# Patient Record
Sex: FEMALE | Race: WHITE | NOT HISPANIC OR LATINO
[De-identification: names, ages, dates, MRNs, and addresses within clinical notes are randomized per-mention and may not be internally consistent; named-entity substitution may affect disease eponyms.]

---

## 2020-09-09 ENCOUNTER — APPOINTMENT (OUTPATIENT)
Dept: FAMILY MEDICINE | Facility: CLINIC | Age: 40
End: 2020-09-09
Payer: COMMERCIAL

## 2020-09-09 ENCOUNTER — LABORATORY RESULT (OUTPATIENT)
Age: 40
End: 2020-09-09

## 2020-09-09 VITALS
OXYGEN SATURATION: 98 % | WEIGHT: 250 LBS | BODY MASS INDEX: 41.65 KG/M2 | RESPIRATION RATE: 16 BRPM | SYSTOLIC BLOOD PRESSURE: 120 MMHG | HEART RATE: 92 BPM | DIASTOLIC BLOOD PRESSURE: 80 MMHG | HEIGHT: 65 IN

## 2020-09-09 DIAGNOSIS — Z81.8 FAMILY HISTORY OF OTHER MENTAL AND BEHAVIORAL DISORDERS: ICD-10-CM

## 2020-09-09 DIAGNOSIS — Z23 ENCOUNTER FOR IMMUNIZATION: ICD-10-CM

## 2020-09-09 LAB
ALBUMIN SERPL ELPH-MCNC: 4.7 G/DL
ALP BLD-CCNC: 47 U/L
ALT SERPL-CCNC: 38 U/L
ANION GAP SERPL CALC-SCNC: 11 MMOL/L
APPEARANCE: CLEAR
AST SERPL-CCNC: 26 U/L
BASOPHILS # BLD AUTO: 0.02 K/UL
BASOPHILS NFR BLD AUTO: 0.4 %
BILIRUB SERPL-MCNC: 0.5 MG/DL
BILIRUBIN URINE: NEGATIVE
BLOOD URINE: NEGATIVE
BUN SERPL-MCNC: 12 MG/DL
CALCIUM SERPL-MCNC: 10 MG/DL
CHLORIDE SERPL-SCNC: 104 MMOL/L
CHOLEST SERPL-MCNC: 159 MG/DL
CHOLEST/HDLC SERPL: 2.6 RATIO
CO2 SERPL-SCNC: 25 MMOL/L
COLOR: NORMAL
CREAT SERPL-MCNC: 0.94 MG/DL
EOSINOPHIL # BLD AUTO: 0.08 K/UL
EOSINOPHIL NFR BLD AUTO: 1.5 %
ESTIMATED AVERAGE GLUCOSE: 105 MG/DL
GLUCOSE QUALITATIVE U: NEGATIVE
GLUCOSE SERPL-MCNC: 102 MG/DL
HBA1C MFR BLD HPLC: 5.3 %
HCT VFR BLD CALC: 46.8 %
HDLC SERPL-MCNC: 62 MG/DL
HGB BLD-MCNC: 14.8 G/DL
IMM GRANULOCYTES NFR BLD AUTO: 0.2 %
KETONES URINE: NEGATIVE
LDLC SERPL CALC-MCNC: 75 MG/DL
LEUKOCYTE ESTERASE URINE: ABNORMAL
LYMPHOCYTES # BLD AUTO: 1.32 K/UL
LYMPHOCYTES NFR BLD AUTO: 25 %
MAN DIFF?: NORMAL
MCHC RBC-ENTMCNC: 29.2 PG
MCHC RBC-ENTMCNC: 31.6 GM/DL
MCV RBC AUTO: 92.5 FL
MONOCYTES # BLD AUTO: 0.23 K/UL
MONOCYTES NFR BLD AUTO: 4.4 %
NEUTROPHILS # BLD AUTO: 3.61 K/UL
NEUTROPHILS NFR BLD AUTO: 68.5 %
NITRITE URINE: NEGATIVE
PH URINE: 7
PLATELET # BLD AUTO: 382 K/UL
POTASSIUM SERPL-SCNC: 4.9 MMOL/L
PROT SERPL-MCNC: 7.3 G/DL
PROTEIN URINE: NEGATIVE
RBC # BLD: 5.06 M/UL
RBC # FLD: 12.9 %
SODIUM SERPL-SCNC: 140 MMOL/L
SPECIFIC GRAVITY URINE: 1.01
THYROGLOB AB SERPL-ACNC: 279 IU/ML
THYROPEROXIDASE AB SERPL IA-ACNC: 49.8 IU/ML
TRIGL SERPL-MCNC: 110 MG/DL
TSH SERPL-ACNC: 8.38 UIU/ML
UROBILINOGEN URINE: NORMAL
WBC # FLD AUTO: 5.27 K/UL

## 2020-09-09 PROCEDURE — 99385 PREV VISIT NEW AGE 18-39: CPT | Mod: 25

## 2020-09-09 PROCEDURE — 36415 COLL VENOUS BLD VENIPUNCTURE: CPT

## 2020-09-09 PROCEDURE — 90686 IIV4 VACC NO PRSV 0.5 ML IM: CPT

## 2020-09-09 PROCEDURE — G0008: CPT

## 2020-09-09 RX ORDER — MULTIVITAMIN
TABLET ORAL
Refills: 0 | Status: ACTIVE | COMMUNITY
Start: 2020-09-09

## 2020-09-09 NOTE — HEALTH RISK ASSESSMENT
[Excellent] : ~his/her~  mood as  excellent [] : No [No falls in past year] : Patient reported no falls in the past year [Yes] : Yes [[id number]] : 0 [0] : 2) Feeling down, depressed, or hopeless: Not at all (0) [With Significant Other] : lives with significant other [Employed] : employed [] :  [Fully functional (using the telephone, shopping, preparing meals, housekeeping, doing laundry, using] : Fully functional and needs no help or supervision to perform IADLs (using the telephone, shopping, preparing meals, housekeeping, doing laundry, using transportation, managing medications and managing finances) [Fully functional (bathing, dressing, toileting, transferring, walking, feeding)] : Fully functional (bathing, dressing, toileting, transferring, walking, feeding) [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

## 2020-09-09 NOTE — ASSESSMENT
[FreeTextEntry1] : Patient was counseled on healthy eating habits, on daily exercise and stress relief. All medications and allergies were reviewed with the patient and any adjustments necessary were made. Patient was counseled to try engage in an exercise activity for at least 30 minutes 3-4 times a week.  Patient was advised to eat a diet low in fat and carbohydrates and high in protein, with plenty of vegetables. Patient was advised to try and engage in relaxing activities whenever possible.\par The patients blood was draw in office and will be followed and assessed for any issues.  Patient was told to return to the office if any issues arise.  Unless otherwise stated, the patient is to continue all other medications as previously prescribed.\par \par PCOS\par on metformin

## 2020-09-09 NOTE — PHYSICAL EXAM
[Well Nourished] : well nourished [Clear to Auscultation] : lungs were clear to auscultation bilaterally [No Joint Swelling] : no joint swelling [Normal S1, S2] : normal S1 and S2 [Regular Rhythm] : with a regular rhythm [Normal Gait] : normal gait [No Rash] : no rash [Normal Affect] : the affect was normal [Normal Insight/Judgement] : insight and judgment were intact

## 2020-09-12 ENCOUNTER — TRANSCRIPTION ENCOUNTER (OUTPATIENT)
Age: 40
End: 2020-09-12

## 2020-09-17 ENCOUNTER — TRANSCRIPTION ENCOUNTER (OUTPATIENT)
Age: 40
End: 2020-09-17

## 2020-09-18 ENCOUNTER — TRANSCRIPTION ENCOUNTER (OUTPATIENT)
Age: 40
End: 2020-09-18

## 2020-10-13 ENCOUNTER — APPOINTMENT (OUTPATIENT)
Dept: ULTRASOUND IMAGING | Facility: IMAGING CENTER | Age: 40
End: 2020-10-13
Payer: COMMERCIAL

## 2020-10-13 ENCOUNTER — OUTPATIENT (OUTPATIENT)
Dept: OUTPATIENT SERVICES | Facility: HOSPITAL | Age: 40
LOS: 1 days | End: 2020-10-13
Payer: COMMERCIAL

## 2020-10-13 DIAGNOSIS — E07.9 DISORDER OF THYROID, UNSPECIFIED: ICD-10-CM

## 2020-10-13 PROCEDURE — 76536 US EXAM OF HEAD AND NECK: CPT | Mod: 26

## 2020-10-13 PROCEDURE — 76536 US EXAM OF HEAD AND NECK: CPT

## 2020-10-14 ENCOUNTER — APPOINTMENT (OUTPATIENT)
Dept: FAMILY MEDICINE | Facility: CLINIC | Age: 40
End: 2020-10-14
Payer: COMMERCIAL

## 2020-10-14 VITALS
BODY MASS INDEX: 40.48 KG/M2 | HEART RATE: 89 BPM | DIASTOLIC BLOOD PRESSURE: 70 MMHG | OXYGEN SATURATION: 90 % | WEIGHT: 243 LBS | HEIGHT: 65 IN | SYSTOLIC BLOOD PRESSURE: 130 MMHG

## 2020-10-14 PROCEDURE — 36415 COLL VENOUS BLD VENIPUNCTURE: CPT

## 2020-10-14 PROCEDURE — 99214 OFFICE O/P EST MOD 30 MIN: CPT | Mod: 25

## 2020-10-14 NOTE — HISTORY OF PRESENT ILLNESS
[de-identified] : 39 year old female is here for a followup visit. Patient is here for medication renewals and for blood work discussion. Medications and allergies were reviewed and assessed.  There has been no new medications since the last visit. \par \par patient trying to conceive, last tsh was 8, started on synthroid

## 2020-10-14 NOTE — ASSESSMENT
[FreeTextEntry1] : thyroid dysfunction\par Patient had a diagnosis of thyroid disorder. Blood was drawn to assess levels of TSH and T4. Patient will continue on current dose of medications at this time unless instructed otherwise. Patient was advised to take thyroid medications on a empty stomach and to wait at least 45 minutes before eating for appropriate absorption.\par patient went for us yesterday\par last tsh was 8, started on synthroid 75, feeling better, lost weight, trying to conceive so will have goal of tsh less than 2\par lost 7 pounds\par \par PCOS\par on metformin

## 2020-10-14 NOTE — HEALTH RISK ASSESSMENT
[] : No [Yes] : Yes [No falls in past year] : Patient reported no falls in the past year [0] : 2) Feeling down, depressed, or hopeless: Not at all (0) [[id number]] : 0 [Excellent] : ~his/her~  mood as  excellent [With Significant Other] : lives with significant other [Employed] : employed [] :  [Fully functional (bathing, dressing, toileting, transferring, walking, feeding)] : Fully functional (bathing, dressing, toileting, transferring, walking, feeding) [Fully functional (using the telephone, shopping, preparing meals, housekeeping, doing laundry, using] : Fully functional and needs no help or supervision to perform IADLs (using the telephone, shopping, preparing meals, housekeeping, doing laundry, using transportation, managing medications and managing finances) [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

## 2020-10-15 LAB
T3 SERPL-MCNC: 118 NG/DL
T4 FREE SERPL-MCNC: 1.4 NG/DL
TSH SERPL-ACNC: 4.18 UIU/ML

## 2020-10-16 ENCOUNTER — TRANSCRIPTION ENCOUNTER (OUTPATIENT)
Age: 40
End: 2020-10-16

## 2020-12-14 ENCOUNTER — TRANSCRIPTION ENCOUNTER (OUTPATIENT)
Age: 40
End: 2020-12-14

## 2020-12-18 ENCOUNTER — APPOINTMENT (OUTPATIENT)
Dept: FAMILY MEDICINE | Facility: CLINIC | Age: 40
End: 2020-12-18
Payer: COMMERCIAL

## 2020-12-18 VITALS
HEART RATE: 101 BPM | DIASTOLIC BLOOD PRESSURE: 74 MMHG | OXYGEN SATURATION: 98 % | BODY MASS INDEX: 39.27 KG/M2 | SYSTOLIC BLOOD PRESSURE: 122 MMHG | WEIGHT: 236 LBS

## 2020-12-18 LAB
T3 SERPL-MCNC: 108 NG/DL
T4 FREE SERPL-MCNC: 1.4 NG/DL
TSH SERPL-ACNC: 3.02 UIU/ML

## 2020-12-18 PROCEDURE — 36415 COLL VENOUS BLD VENIPUNCTURE: CPT

## 2020-12-18 PROCEDURE — 99214 OFFICE O/P EST MOD 30 MIN: CPT | Mod: 25

## 2020-12-18 PROCEDURE — 99072 ADDL SUPL MATRL&STAF TM PHE: CPT

## 2020-12-18 NOTE — ASSESSMENT
[FreeTextEntry1] : thyroid dysfunction\par Patient had a diagnosis of thyroid disorder. Blood was drawn to assess levels of TSH and T4. Patient will continue on current dose of medications at this time unless instructed otherwise. Patient was advised to take thyroid medications on a empty stomach and to wait at least 45 minutes before eating for appropriate absorption.\par patient went for us yesterday\par last tsh was 8, started on synthroid 75, feeling better, lost weight, \par last tsh is 4\par now 6 weeks pregnant, will increase synthroid to 100 to keep tsh under 2.\par \par PCOS\par on metformin

## 2020-12-18 NOTE — HEALTH RISK ASSESSMENT
[] : No [Yes] : Yes [No falls in past year] : Patient reported no falls in the past year [0] : 2) Feeling down, depressed, or hopeless: Not at all (0) [BSY5Uizbn] : 0 [Excellent] : ~his/her~  mood as  excellent [With Significant Other] : lives with significant other [Employed] : employed [] :  [Fully functional (bathing, dressing, toileting, transferring, walking, feeding)] : Fully functional (bathing, dressing, toileting, transferring, walking, feeding) [Fully functional (using the telephone, shopping, preparing meals, housekeeping, doing laundry, using] : Fully functional and needs no help or supervision to perform IADLs (using the telephone, shopping, preparing meals, housekeeping, doing laundry, using transportation, managing medications and managing finances) [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

## 2020-12-18 NOTE — HISTORY OF PRESENT ILLNESS
[de-identified] : 40 year old female is here for a followup visit. Patient is here for medication renewals and for blood work discussion. Medications and allergies were reviewed and assessed.  There has been no new medications since the last visit. Patient is feeling well with no active changes or issues since Her last visit.\par \par patient trying to conceive, last tsh was 8, started on synthroid, went down to 4

## 2021-01-13 ENCOUNTER — RESULT REVIEW (OUTPATIENT)
Age: 41
End: 2021-01-13

## 2021-01-25 ENCOUNTER — TRANSCRIPTION ENCOUNTER (OUTPATIENT)
Age: 41
End: 2021-01-25

## 2021-01-26 ENCOUNTER — APPOINTMENT (OUTPATIENT)
Dept: FAMILY MEDICINE | Facility: CLINIC | Age: 41
End: 2021-01-26

## 2021-01-29 ENCOUNTER — APPOINTMENT (OUTPATIENT)
Dept: ANTEPARTUM | Facility: CLINIC | Age: 41
End: 2021-01-29

## 2021-02-03 ENCOUNTER — APPOINTMENT (OUTPATIENT)
Dept: ANTEPARTUM | Facility: CLINIC | Age: 41
End: 2021-02-03

## 2021-02-04 ENCOUNTER — APPOINTMENT (OUTPATIENT)
Dept: ANTEPARTUM | Facility: CLINIC | Age: 41
End: 2021-02-04
Payer: COMMERCIAL

## 2021-02-04 ENCOUNTER — APPOINTMENT (OUTPATIENT)
Dept: ANTEPARTUM | Facility: CLINIC | Age: 41
End: 2021-02-04

## 2021-02-04 ENCOUNTER — ASOB RESULT (OUTPATIENT)
Age: 41
End: 2021-02-04

## 2021-02-04 ENCOUNTER — OUTPATIENT (OUTPATIENT)
Dept: OUTPATIENT SERVICES | Facility: HOSPITAL | Age: 41
LOS: 1 days | End: 2021-02-04
Payer: COMMERCIAL

## 2021-02-04 DIAGNOSIS — Z3A.12 12 WEEKS GESTATION OF PREGNANCY: ICD-10-CM

## 2021-02-04 DIAGNOSIS — O28.5 ABNORMAL CHROMOSOMAL AND GENETIC FINDING ON ANTENATAL SCREENING OF MOTHER: ICD-10-CM

## 2021-02-04 DIAGNOSIS — Z01.818 ENCOUNTER FOR OTHER PREPROCEDURAL EXAMINATION: ICD-10-CM

## 2021-02-04 DIAGNOSIS — O99.281 ENDOCRINE, NUTRITIONAL AND METABOLIC DISEASES COMPLICATING PREGNANCY, FIRST TRIMESTER: ICD-10-CM

## 2021-02-04 DIAGNOSIS — O99.211 OBESITY COMPLICATING PREGNANCY, FIRST TRIMESTER: ICD-10-CM

## 2021-02-04 DIAGNOSIS — O09.521 SUPERVISION OF ELDERLY MULTIGRAVIDA, FIRST TRIMESTER: ICD-10-CM

## 2021-02-04 DIAGNOSIS — Z36.2 ENCOUNTER FOR OTHER ANTENATAL SCREENING FOLLOW-UP: ICD-10-CM

## 2021-02-04 PROCEDURE — 88280 CHROMOSOME KARYOTYPE STUDY: CPT

## 2021-02-04 PROCEDURE — 88291 CYTO/MOLECULAR REPORT: CPT

## 2021-02-04 PROCEDURE — 76945 ECHO GUIDE VILLUS SAMPLING: CPT

## 2021-02-04 PROCEDURE — 59015 CHORION BIOPSY: CPT

## 2021-02-04 PROCEDURE — 76945 ECHO GUIDE VILLUS SAMPLING: CPT | Mod: 26

## 2021-02-04 PROCEDURE — 99072 ADDL SUPL MATRL&STAF TM PHE: CPT

## 2021-02-04 PROCEDURE — 88267 CHROMOSOME ANALYS PLACENTA: CPT

## 2021-02-04 PROCEDURE — 76801 OB US < 14 WKS SINGLE FETUS: CPT

## 2021-02-04 PROCEDURE — 88271 CYTOGENETICS DNA PROBE: CPT

## 2021-02-04 PROCEDURE — 88275 CYTOGENETICS 100-300: CPT

## 2021-02-04 PROCEDURE — 88235 TISSUE CULTURE PLACENTA: CPT

## 2021-02-04 PROCEDURE — 88285 CHROMOSOME COUNT ADDITIONAL: CPT

## 2021-02-16 ENCOUNTER — APPOINTMENT (OUTPATIENT)
Dept: OBGYN | Facility: CLINIC | Age: 41
End: 2021-02-16
Payer: COMMERCIAL

## 2021-02-16 ENCOUNTER — OUTPATIENT (OUTPATIENT)
Dept: OUTPATIENT SERVICES | Facility: HOSPITAL | Age: 41
LOS: 1 days | End: 2021-02-16
Payer: COMMERCIAL

## 2021-02-16 VITALS
HEIGHT: 65 IN | WEIGHT: 242 LBS | DIASTOLIC BLOOD PRESSURE: 70 MMHG | BODY MASS INDEX: 40.32 KG/M2 | TEMPERATURE: 97.5 F | SYSTOLIC BLOOD PRESSURE: 110 MMHG

## 2021-02-16 DIAGNOSIS — Q90.9 DOWN SYNDROME, UNSPECIFIED: ICD-10-CM

## 2021-02-16 DIAGNOSIS — N92.6 IRREGULAR MENSTRUATION, UNSPECIFIED: ICD-10-CM

## 2021-02-16 DIAGNOSIS — Z86.39 PERSONAL HISTORY OF OTHER ENDOCRINE, NUTRITIONAL AND METABOLIC DISEASE: ICD-10-CM

## 2021-02-16 DIAGNOSIS — Z78.9 OTHER SPECIFIED HEALTH STATUS: ICD-10-CM

## 2021-02-16 DIAGNOSIS — Z11.52 ENCOUNTER FOR SCREENING FOR COVID-19: ICD-10-CM

## 2021-02-16 LAB — SARS-COV-2 RNA SPEC QL NAA+PROBE: SIGNIFICANT CHANGE UP

## 2021-02-16 PROCEDURE — 99072 ADDL SUPL MATRL&STAF TM PHE: CPT

## 2021-02-16 PROCEDURE — 76815 OB US LIMITED FETUS(S): CPT

## 2021-02-16 PROCEDURE — 99203 OFFICE O/P NEW LOW 30 MIN: CPT | Mod: 25

## 2021-02-16 PROCEDURE — C9803: CPT

## 2021-02-16 PROCEDURE — U0005: CPT

## 2021-02-16 PROCEDURE — U0003: CPT

## 2021-02-16 NOTE — PHYSICAL EXAM
[Appropriately responsive] : appropriately responsive [Alert] : alert [No Acute Distress] : no acute distress [Oriented x3] : oriented x3 [Soft] : soft [Non-tender] : non-tender [Non-distended] : non-distended

## 2021-02-16 NOTE — PROCEDURE
[Transabdominal OB Sonogram] : Transabdominal OB Sonogram [Intrauterine Pregnancy] : intrauterine pregnancy [Fetal Heart] : fetal heart present [FreeTextEntry1] : BPD: 2.57

## 2021-02-17 ENCOUNTER — OUTPATIENT (OUTPATIENT)
Dept: OUTPATIENT SERVICES | Facility: HOSPITAL | Age: 41
LOS: 1 days | End: 2021-02-17
Payer: COMMERCIAL

## 2021-02-17 VITALS
SYSTOLIC BLOOD PRESSURE: 129 MMHG | TEMPERATURE: 98 F | HEIGHT: 66 IN | DIASTOLIC BLOOD PRESSURE: 83 MMHG | HEART RATE: 94 BPM | RESPIRATION RATE: 20 BRPM | WEIGHT: 246.04 LBS | OXYGEN SATURATION: 98 %

## 2021-02-17 DIAGNOSIS — Z98.890 OTHER SPECIFIED POSTPROCEDURAL STATES: Chronic | ICD-10-CM

## 2021-02-17 DIAGNOSIS — Q90.9 DOWN SYNDROME, UNSPECIFIED: ICD-10-CM

## 2021-02-17 DIAGNOSIS — Z01.818 ENCOUNTER FOR OTHER PREPROCEDURAL EXAMINATION: ICD-10-CM

## 2021-02-17 LAB
BLD GP AB SCN SERPL QL: NEGATIVE — SIGNIFICANT CHANGE UP
HCT VFR BLD CALC: 43.2 % — SIGNIFICANT CHANGE UP (ref 34.5–45)
HGB BLD-MCNC: 14.3 G/DL — SIGNIFICANT CHANGE UP (ref 11.5–15.5)
MCHC RBC-ENTMCNC: 29.5 PG — SIGNIFICANT CHANGE UP (ref 27–34)
MCHC RBC-ENTMCNC: 33.1 GM/DL — SIGNIFICANT CHANGE UP (ref 32–36)
MCV RBC AUTO: 89.1 FL — SIGNIFICANT CHANGE UP (ref 80–100)
NRBC # BLD: 0 /100 WBCS — SIGNIFICANT CHANGE UP (ref 0–0)
PLATELET # BLD AUTO: 365 K/UL — SIGNIFICANT CHANGE UP (ref 150–400)
RBC # BLD: 4.85 M/UL — SIGNIFICANT CHANGE UP (ref 3.8–5.2)
RBC # FLD: 12.4 % — SIGNIFICANT CHANGE UP (ref 10.3–14.5)
RH IG SCN BLD-IMP: POSITIVE — SIGNIFICANT CHANGE UP
WBC # BLD: 7.43 K/UL — SIGNIFICANT CHANGE UP (ref 3.8–10.5)
WBC # FLD AUTO: 7.43 K/UL — SIGNIFICANT CHANGE UP (ref 3.8–10.5)

## 2021-02-17 PROCEDURE — G0463: CPT

## 2021-02-17 PROCEDURE — 86900 BLOOD TYPING SEROLOGIC ABO: CPT

## 2021-02-17 PROCEDURE — 86850 RBC ANTIBODY SCREEN: CPT

## 2021-02-17 PROCEDURE — 85027 COMPLETE CBC AUTOMATED: CPT

## 2021-02-17 PROCEDURE — 86901 BLOOD TYPING SEROLOGIC RH(D): CPT

## 2021-02-17 RX ORDER — LIDOCAINE HCL 20 MG/ML
0.2 VIAL (ML) INJECTION ONCE
Refills: 0 | Status: DISCONTINUED | OUTPATIENT
Start: 2021-02-19 | End: 2021-03-06

## 2021-02-17 RX ORDER — SODIUM CHLORIDE 9 MG/ML
3 INJECTION INTRAMUSCULAR; INTRAVENOUS; SUBCUTANEOUS EVERY 8 HOURS
Refills: 0 | Status: DISCONTINUED | OUTPATIENT
Start: 2021-02-19 | End: 2021-03-06

## 2021-02-17 NOTE — H&P PST ADULT - NSANTHOSAYNRD_GEN_A_CORE
No. RUT screening performed.  STOP BANG Legend: 0-2 = LOW Risk; 3-4 = INTERMEDIATE Risk; 5-8 = HIGH Risk

## 2021-02-17 NOTE — H&P PST ADULT - NSICDXPROBLEM_GEN_ALL_CORE_FT
PROBLEM DIAGNOSES  Problem: Down syndrome, unspecified  Assessment and Plan: D&E w/ultrsound guidance

## 2021-02-17 NOTE — H&P PST ADULT - HISTORY OF PRESENT ILLNESS
39 yo female  14 wks pregnancy with abnormal results showing trisomy 21. 39 yo female  14 wks pregnancy with abnormal results showing trisomy 21. Pt for D&E scheduled 21.    COVID swab 21 41 yo female  14 wks pregnant, LMP 2020 with fetal anomaly of trisomy 21. Pt for D&E scheduled 21.    COVID swab 21

## 2021-02-18 ENCOUNTER — TRANSCRIPTION ENCOUNTER (OUTPATIENT)
Age: 41
End: 2021-02-18

## 2021-02-19 ENCOUNTER — APPOINTMENT (OUTPATIENT)
Dept: OBGYN | Facility: CLINIC | Age: 41
End: 2021-02-19

## 2021-02-19 ENCOUNTER — OUTPATIENT (OUTPATIENT)
Dept: OUTPATIENT SERVICES | Facility: HOSPITAL | Age: 41
LOS: 1 days | End: 2021-02-19
Payer: COMMERCIAL

## 2021-02-19 ENCOUNTER — RESULT REVIEW (OUTPATIENT)
Age: 41
End: 2021-02-19

## 2021-02-19 VITALS
RESPIRATION RATE: 16 BRPM | HEART RATE: 82 BPM | OXYGEN SATURATION: 100 % | SYSTOLIC BLOOD PRESSURE: 111 MMHG | DIASTOLIC BLOOD PRESSURE: 61 MMHG

## 2021-02-19 VITALS
TEMPERATURE: 99 F | HEIGHT: 66 IN | DIASTOLIC BLOOD PRESSURE: 84 MMHG | RESPIRATION RATE: 16 BRPM | SYSTOLIC BLOOD PRESSURE: 128 MMHG | WEIGHT: 246.04 LBS | HEART RATE: 106 BPM | OXYGEN SATURATION: 99 %

## 2021-02-19 DIAGNOSIS — Z01.818 ENCOUNTER FOR OTHER PREPROCEDURAL EXAMINATION: ICD-10-CM

## 2021-02-19 DIAGNOSIS — Q90.9 DOWN SYNDROME, UNSPECIFIED: ICD-10-CM

## 2021-02-19 DIAGNOSIS — Z98.890 OTHER SPECIFIED POSTPROCEDURAL STATES: Chronic | ICD-10-CM

## 2021-02-19 LAB
BLD GP AB SCN SERPL QL: NEGATIVE — SIGNIFICANT CHANGE UP
RH IG SCN BLD-IMP: POSITIVE — SIGNIFICANT CHANGE UP

## 2021-02-19 PROCEDURE — 76998 US GUIDE INTRAOP: CPT | Mod: 26

## 2021-02-19 PROCEDURE — 59841 INDUCED ABORTION DILAT&EVAC: CPT

## 2021-02-19 PROCEDURE — 86901 BLOOD TYPING SEROLOGIC RH(D): CPT

## 2021-02-19 PROCEDURE — 86900 BLOOD TYPING SEROLOGIC ABO: CPT

## 2021-02-19 PROCEDURE — 86850 RBC ANTIBODY SCREEN: CPT

## 2021-02-19 PROCEDURE — 88307 TISSUE EXAM BY PATHOLOGIST: CPT

## 2021-02-19 PROCEDURE — 88307 TISSUE EXAM BY PATHOLOGIST: CPT | Mod: 26

## 2021-02-19 RX ORDER — OXYCODONE HYDROCHLORIDE 5 MG/1
5 TABLET ORAL ONCE
Refills: 0 | Status: DISCONTINUED | OUTPATIENT
Start: 2021-02-19 | End: 2021-02-19

## 2021-02-19 RX ORDER — HYDROMORPHONE HYDROCHLORIDE 2 MG/ML
0.5 INJECTION INTRAMUSCULAR; INTRAVENOUS; SUBCUTANEOUS
Refills: 0 | Status: DISCONTINUED | OUTPATIENT
Start: 2021-02-19 | End: 2021-02-19

## 2021-02-19 RX ORDER — SODIUM CHLORIDE 9 MG/ML
1000 INJECTION, SOLUTION INTRAVENOUS
Refills: 0 | Status: DISCONTINUED | OUTPATIENT
Start: 2021-02-19 | End: 2021-03-06

## 2021-02-19 RX ORDER — ONDANSETRON 8 MG/1
4 TABLET, FILM COATED ORAL ONCE
Refills: 0 | Status: COMPLETED | OUTPATIENT
Start: 2021-02-19 | End: 2021-02-19

## 2021-02-19 RX ADMIN — ONDANSETRON 4 MILLIGRAM(S): 8 TABLET, FILM COATED ORAL at 15:18

## 2021-02-19 RX ADMIN — HYDROMORPHONE HYDROCHLORIDE 0.5 MILLIGRAM(S): 2 INJECTION INTRAMUSCULAR; INTRAVENOUS; SUBCUTANEOUS at 15:18

## 2021-02-19 NOTE — BRIEF OPERATIVE NOTE - NSICDXBRIEFPROCEDURE_GEN_ALL_CORE_FT
PROCEDURES:  Dilation and evacuation, uterus, using suction, with US guidance 19-Feb-2021 18:18:17  Frankel, Robyn   PROCEDURES:  Induced  by dilation and evacuation 2021 09:38:51 1. examination under anesthesia  2. standard dilation and evacuation under ultrasound guidance Rhonda Issa

## 2021-02-19 NOTE — BRIEF OPERATIVE NOTE - NSICDXBRIEFPREOP_GEN_ALL_CORE_FT
PRE-OP DIAGNOSIS:  Intrauterine pregnancy 19-Feb-2021 18:18:53  Frankel, Robyn  Trisomy 21 of fetus, current pregnancy 19-Feb-2021 18:19:04  Frankel, Robyn   PRE-OP DIAGNOSIS:  Class 2 obesity with body mass index (BMI) of 39.0 to 39.9 in adult 20-Feb-2021 09:40:00  Rhonda Issa  Trisomy 21 of fetus, current pregnancy 19-Feb-2021 18:19:04 1. IUP @ 14 6/7 weeks  2. trisomy 21 Frankel, Robyn

## 2021-02-19 NOTE — BRIEF OPERATIVE NOTE - OPERATION/FINDINGS
EUA revealed anteverted, 14wk size uterus c/w EGA.   POC examined and consisted with 14wk GA. All fetal parts and placenta accounted for. Entire procedure performed under ultrasound guidance, thin endometrial stripe noted at completion of procedure. anteverted uterus approximately 14 weeks size. fetus and placenta AGA, all fetal parts accounted for. BT: A+

## 2021-02-19 NOTE — ASU DISCHARGE PLAN (ADULT/PEDIATRIC) - CARE PROVIDER_API CALL
Rhonda Issa)  OBSGYN  General  [address] [place] [id number]  Phone: [phone]  Fax: [phone]  Established Patient  Follow Up Time: 2 weeks

## 2021-02-19 NOTE — BRIEF OPERATIVE NOTE - NSICDXBRIEFPOSTOP_GEN_ALL_CORE_FT
POST-OP DIAGNOSIS:  Trisomy 21 of fetus, current pregnancy 19-Feb-2021 18:19:15  Frankel, Robyn  Intrauterine pregnancy 19-Feb-2021 18:19:09  Frankel, Robyn   POST-OP DIAGNOSIS:  Class 2 obesity with body mass index (BMI) of 39.0 to 39.9 in adult 20-Feb-2021 09:40:15  Rhonda Issa  Trisomy 21 of fetus, current pregnancy 19-Feb-2021 18:19:15  Frankel, Robyn

## 2021-02-22 ENCOUNTER — NON-APPOINTMENT (OUTPATIENT)
Age: 41
End: 2021-02-22

## 2021-02-22 PROBLEM — E28.2 POLYCYSTIC OVARIAN SYNDROME: Chronic | Status: ACTIVE | Noted: 2021-02-17

## 2021-02-22 PROBLEM — E07.9 DISORDER OF THYROID, UNSPECIFIED: Chronic | Status: ACTIVE | Noted: 2021-02-17

## 2021-03-01 ENCOUNTER — RX RENEWAL (OUTPATIENT)
Age: 41
End: 2021-03-01

## 2021-03-01 ENCOUNTER — TRANSCRIPTION ENCOUNTER (OUTPATIENT)
Age: 41
End: 2021-03-01

## 2021-03-02 ENCOUNTER — TRANSCRIPTION ENCOUNTER (OUTPATIENT)
Age: 41
End: 2021-03-02

## 2021-03-03 LAB — SURGICAL PATHOLOGY STUDY: SIGNIFICANT CHANGE UP

## 2021-03-10 ENCOUNTER — APPOINTMENT (OUTPATIENT)
Dept: OBGYN | Facility: CLINIC | Age: 41
End: 2021-03-10
Payer: COMMERCIAL

## 2021-03-10 PROCEDURE — 99212 OFFICE O/P EST SF 10 MIN: CPT | Mod: 95

## 2021-03-12 ENCOUNTER — APPOINTMENT (OUTPATIENT)
Dept: HUMAN REPRODUCTION | Facility: CLINIC | Age: 41
End: 2021-03-12
Payer: COMMERCIAL

## 2021-03-12 PROCEDURE — 99215 OFFICE O/P EST HI 40 MIN: CPT | Mod: 95

## 2021-03-12 PROCEDURE — 99205 OFFICE O/P NEW HI 60 MIN: CPT | Mod: 95

## 2021-03-22 ENCOUNTER — NON-APPOINTMENT (OUTPATIENT)
Age: 41
End: 2021-03-22

## 2021-03-22 ENCOUNTER — APPOINTMENT (OUTPATIENT)
Dept: ENDOCRINOLOGY | Facility: CLINIC | Age: 41
End: 2021-03-22
Payer: COMMERCIAL

## 2021-03-22 VITALS
DIASTOLIC BLOOD PRESSURE: 80 MMHG | RESPIRATION RATE: 17 BRPM | OXYGEN SATURATION: 99 % | SYSTOLIC BLOOD PRESSURE: 110 MMHG | WEIGHT: 242 LBS | HEIGHT: 65 IN | BODY MASS INDEX: 40.32 KG/M2 | TEMPERATURE: 97.7 F | HEART RATE: 80 BPM

## 2021-03-22 PROCEDURE — 99072 ADDL SUPL MATRL&STAF TM PHE: CPT

## 2021-03-22 PROCEDURE — 99205 OFFICE O/P NEW HI 60 MIN: CPT | Mod: 25

## 2021-03-22 PROCEDURE — 36415 COLL VENOUS BLD VENIPUNCTURE: CPT

## 2021-03-22 RX ORDER — MISOPROSTOL 200 UG/1
200 TABLET ORAL
Qty: 2 | Refills: 0 | Status: DISCONTINUED | COMMUNITY
Start: 2021-02-16 | End: 2021-03-22

## 2021-03-22 NOTE — HISTORY OF PRESENT ILLNESS
[FreeTextEntry1] : 40 year female  referred for hypothyroidism management. \par Ms. FRENCH  was diagnosed with hypothyroidism in 09/20 in settings for fertility evaluation. She was started on L-thyroxine at that time. She got pregnant in 11/20, s/p TOP for trisomy 21. She also reports one prior miscarriage.  She's presently on a generic L-thyroxine 100 mcg. She's been also diagnosed with PCOS, and has been on metformin for the past 4 years. She's under care of the fertility specialist. She's a doing a pediatric ID fellowship at [facility].\par Nandini denies family history of thyroid cancer or history of radiation exposure to head and neck area in a childhood. Her mother is being treated for Hashimoto's thyroiditis. \par Labs from 01/21- TSH- 1.78\par Thyroid US (10/13/20)- heterogenous thyroid. no nodules

## 2021-03-22 NOTE — ASSESSMENT
[FreeTextEntry1] : - thyroid panel, antibodies today\par - continue L-thyroxine 100 mcg, pending labs. Will likely switch to a brand preparation post labs\par - TSH goal pre-conception 2.5\par - PNV with 150 mcg iodine\par Proper intake of levothyroxine is reviewed in details, including on an empty stomach, with water only, at least one hour before taking any medications, vitamins, or supplements and three-four hours before taking iron or calcium.\par Patient is advised that if  becomes pregnant, should call immediately since the thyroid hormone dose may need to be adjusted.\par - of note, we discussed an off label use of metformin in pregnancy. She'll channel further decisions via her fertility specialist\par RTC 3 months, or sooner once gets pregnant\par \par \par

## 2021-03-22 NOTE — CONSULT LETTER
[Dear  ___] : Dear  [unfilled], [Sincerely,] : Sincerely, [FreeTextEntry1] : Thank you for referring  Ms. JEMAL FRENCH to me for evaluation and treatment. Please, see attached consultation note. As always, if there are specific questions you would like to discuss, please feel free to contact me.\par Thank you for the courtesy of this evaluation.\par  [FreeTextEntry3] : Laron Lindsay MD, FACE, ECNU\par

## 2021-03-23 LAB
T3 SERPL-MCNC: 119 NG/DL
T4 FREE SERPL-MCNC: 1.4 NG/DL
T4 SERPL-MCNC: 9.6 UG/DL
THYROGLOB AB SERPL-ACNC: 47 IU/ML
THYROPEROXIDASE AB SERPL IA-ACNC: 38.1 IU/ML
TSH SERPL-ACNC: 1.4 UIU/ML

## 2021-03-24 ENCOUNTER — TRANSCRIPTION ENCOUNTER (OUTPATIENT)
Age: 41
End: 2021-03-24

## 2021-04-19 ENCOUNTER — RESULT REVIEW (OUTPATIENT)
Age: 41
End: 2021-04-19

## 2021-05-19 ENCOUNTER — APPOINTMENT (OUTPATIENT)
Dept: HUMAN REPRODUCTION | Facility: CLINIC | Age: 41
End: 2021-05-19
Payer: COMMERCIAL

## 2021-05-19 PROCEDURE — 99213 OFFICE O/P EST LOW 20 MIN: CPT | Mod: 25

## 2021-05-19 PROCEDURE — 36415 COLL VENOUS BLD VENIPUNCTURE: CPT

## 2021-05-19 PROCEDURE — 76830 TRANSVAGINAL US NON-OB: CPT

## 2021-05-19 PROCEDURE — 99072 ADDL SUPL MATRL&STAF TM PHE: CPT

## 2021-05-22 ENCOUNTER — APPOINTMENT (OUTPATIENT)
Dept: HUMAN REPRODUCTION | Facility: CLINIC | Age: 41
End: 2021-05-22
Payer: COMMERCIAL

## 2021-05-22 PROCEDURE — 76830 TRANSVAGINAL US NON-OB: CPT

## 2021-05-22 PROCEDURE — 36415 COLL VENOUS BLD VENIPUNCTURE: CPT

## 2021-05-22 PROCEDURE — 99213 OFFICE O/P EST LOW 20 MIN: CPT | Mod: 25

## 2021-05-22 PROCEDURE — 99072 ADDL SUPL MATRL&STAF TM PHE: CPT

## 2021-05-25 ENCOUNTER — APPOINTMENT (OUTPATIENT)
Dept: HUMAN REPRODUCTION | Facility: CLINIC | Age: 41
End: 2021-05-25
Payer: COMMERCIAL

## 2021-05-25 PROCEDURE — 99072 ADDL SUPL MATRL&STAF TM PHE: CPT

## 2021-05-25 PROCEDURE — 99213 OFFICE O/P EST LOW 20 MIN: CPT | Mod: 25

## 2021-05-25 PROCEDURE — 36415 COLL VENOUS BLD VENIPUNCTURE: CPT

## 2021-05-25 PROCEDURE — 76830 TRANSVAGINAL US NON-OB: CPT

## 2021-05-27 ENCOUNTER — APPOINTMENT (OUTPATIENT)
Dept: HUMAN REPRODUCTION | Facility: CLINIC | Age: 41
End: 2021-05-27
Payer: COMMERCIAL

## 2021-05-27 PROCEDURE — 99072 ADDL SUPL MATRL&STAF TM PHE: CPT

## 2021-05-27 PROCEDURE — 99213 OFFICE O/P EST LOW 20 MIN: CPT | Mod: 25

## 2021-05-27 PROCEDURE — 36415 COLL VENOUS BLD VENIPUNCTURE: CPT

## 2021-05-27 PROCEDURE — 76830 TRANSVAGINAL US NON-OB: CPT

## 2021-05-29 ENCOUNTER — APPOINTMENT (OUTPATIENT)
Dept: HUMAN REPRODUCTION | Facility: CLINIC | Age: 41
End: 2021-05-29
Payer: COMMERCIAL

## 2021-05-29 PROCEDURE — 99072 ADDL SUPL MATRL&STAF TM PHE: CPT

## 2021-05-29 PROCEDURE — 99213 OFFICE O/P EST LOW 20 MIN: CPT | Mod: 25

## 2021-05-29 PROCEDURE — 76830 TRANSVAGINAL US NON-OB: CPT

## 2021-05-29 PROCEDURE — 36415 COLL VENOUS BLD VENIPUNCTURE: CPT

## 2021-05-31 ENCOUNTER — APPOINTMENT (OUTPATIENT)
Dept: HUMAN REPRODUCTION | Facility: CLINIC | Age: 41
End: 2021-05-31
Payer: COMMERCIAL

## 2021-05-31 PROCEDURE — 99213 OFFICE O/P EST LOW 20 MIN: CPT | Mod: 25

## 2021-05-31 PROCEDURE — 76830 TRANSVAGINAL US NON-OB: CPT

## 2021-05-31 PROCEDURE — 36415 COLL VENOUS BLD VENIPUNCTURE: CPT

## 2021-05-31 PROCEDURE — 99072 ADDL SUPL MATRL&STAF TM PHE: CPT

## 2021-06-01 ENCOUNTER — APPOINTMENT (OUTPATIENT)
Dept: HUMAN REPRODUCTION | Facility: CLINIC | Age: 41
End: 2021-06-01
Payer: COMMERCIAL

## 2021-06-01 PROCEDURE — 76830 TRANSVAGINAL US NON-OB: CPT

## 2021-06-01 PROCEDURE — 99072 ADDL SUPL MATRL&STAF TM PHE: CPT

## 2021-06-01 PROCEDURE — 99213 OFFICE O/P EST LOW 20 MIN: CPT | Mod: 25

## 2021-06-01 PROCEDURE — 36415 COLL VENOUS BLD VENIPUNCTURE: CPT

## 2021-06-02 ENCOUNTER — APPOINTMENT (OUTPATIENT)
Dept: HUMAN REPRODUCTION | Facility: CLINIC | Age: 41
End: 2021-06-02
Payer: COMMERCIAL

## 2021-06-02 PROCEDURE — 99072 ADDL SUPL MATRL&STAF TM PHE: CPT

## 2021-06-02 PROCEDURE — 36415 COLL VENOUS BLD VENIPUNCTURE: CPT

## 2021-06-03 ENCOUNTER — APPOINTMENT (OUTPATIENT)
Dept: HUMAN REPRODUCTION | Facility: CLINIC | Age: 41
End: 2021-06-03
Payer: COMMERCIAL

## 2021-06-03 PROCEDURE — 58970 RETRIEVAL OF OOCYTE: CPT

## 2021-06-03 PROCEDURE — 76948 ECHO GUIDE OVA ASPIRATION: CPT

## 2021-06-03 PROCEDURE — 99072 ADDL SUPL MATRL&STAF TM PHE: CPT

## 2021-06-03 PROCEDURE — 89250 CULTR OOCYTE/EMBRYO <4 DAYS: CPT

## 2021-06-03 PROCEDURE — 89254 OOCYTE IDENTIFICATION: CPT

## 2021-06-03 PROCEDURE — 89280 ASSIST OOCYTE FERTILIZATION: CPT

## 2021-06-03 PROCEDURE — 89261 SPERM ISOLATION COMPLEX: CPT

## 2021-06-06 PROCEDURE — 89253 EMBRYO HATCHING: CPT

## 2021-06-08 PROCEDURE — 89290 BIOPSY OOCYTE POLAR BODY <=5: CPT

## 2021-06-08 PROCEDURE — 99072 ADDL SUPL MATRL&STAF TM PHE: CPT

## 2021-06-08 PROCEDURE — 89258 CRYOPRESERVATION EMBRYO(S): CPT

## 2021-06-08 PROCEDURE — 89272 EXTENDED CULTURE OF OOCYTES: CPT

## 2021-06-08 PROCEDURE — 89342 STORAGE/YEAR EMBRYO(S): CPT

## 2021-06-15 ENCOUNTER — APPOINTMENT (OUTPATIENT)
Dept: HUMAN REPRODUCTION | Facility: CLINIC | Age: 41
End: 2021-06-15
Payer: COMMERCIAL

## 2021-06-15 PROCEDURE — 76830 TRANSVAGINAL US NON-OB: CPT

## 2021-06-15 PROCEDURE — 36415 COLL VENOUS BLD VENIPUNCTURE: CPT

## 2021-06-15 PROCEDURE — 99072 ADDL SUPL MATRL&STAF TM PHE: CPT

## 2021-06-15 PROCEDURE — 99213 OFFICE O/P EST LOW 20 MIN: CPT | Mod: 25

## 2021-06-21 ENCOUNTER — APPOINTMENT (OUTPATIENT)
Dept: HUMAN REPRODUCTION | Facility: CLINIC | Age: 41
End: 2021-06-21
Payer: COMMERCIAL

## 2021-06-21 PROCEDURE — 76831 ECHO EXAM UTERUS: CPT

## 2021-06-21 PROCEDURE — 58340 CATHETER FOR HYSTEROGRAPHY: CPT

## 2021-06-21 PROCEDURE — 99072 ADDL SUPL MATRL&STAF TM PHE: CPT

## 2021-06-25 ENCOUNTER — APPOINTMENT (OUTPATIENT)
Dept: HUMAN REPRODUCTION | Facility: CLINIC | Age: 41
End: 2021-06-25

## 2021-07-15 ENCOUNTER — APPOINTMENT (OUTPATIENT)
Dept: ENDOCRINOLOGY | Facility: CLINIC | Age: 41
End: 2021-07-15
Payer: COMMERCIAL

## 2021-07-15 VITALS
HEIGHT: 65 IN | DIASTOLIC BLOOD PRESSURE: 70 MMHG | RESPIRATION RATE: 17 BRPM | SYSTOLIC BLOOD PRESSURE: 115 MMHG | TEMPERATURE: 97.9 F | WEIGHT: 231 LBS | BODY MASS INDEX: 38.49 KG/M2 | HEART RATE: 103 BPM | OXYGEN SATURATION: 98 %

## 2021-07-15 PROCEDURE — 36415 COLL VENOUS BLD VENIPUNCTURE: CPT

## 2021-07-15 PROCEDURE — 99213 OFFICE O/P EST LOW 20 MIN: CPT | Mod: 25

## 2021-07-15 PROCEDURE — 99072 ADDL SUPL MATRL&STAF TM PHE: CPT

## 2021-07-15 NOTE — HISTORY OF PRESENT ILLNESS
[FreeTextEntry1] : 40 year female  f/u for hypothyroidism management. \par \par *** Jul 15, 2021 ***\par \par feels well, no new c/o\par on Synthroid brand 100 mcg\par seeing fertility specialist\par \par HPI:\par Ms. FRENCH  was diagnosed with hypothyroidism in 09/20 in settings for fertility evaluation. She was started on L-thyroxine at that time. She got pregnant in 11/20, s/p TOP for trisomy 21. She also reports one prior miscarriage.  She's presently on a generic L-thyroxine 100 mcg. She's been also diagnosed with PCOS, and has been on metformin for the past 4 years. She's under care of the fertility specialist. She's a doing a pediatric ID fellowship at [facility].\par Nandini denies family history of thyroid cancer or history of radiation exposure to head and neck area in a childhood. Her mother is being treated for Hashimoto's thyroiditis. \par Labs from 01/21- TSH- 1.78\par Thyroid US (10/13/20)- heterogenous thyroid. no nodules

## 2021-07-15 NOTE — ASSESSMENT
[FreeTextEntry1] : - thyroid panel today\par - continue Synthroid 100 mcg, pending labs. \par - TSH goal pre-conception 2.5\par - PNV with 150 mcg iodine\par Proper intake of levothyroxine is reviewed in details, including on an empty stomach, with water only, at least one hour before taking any medications, vitamins, or supplements and three-four hours before taking iron or calcium.\par Patient is advised that if  becomes pregnant, should call immediately since the thyroid hormone dose may need to be adjusted.\par - of note, we discussed an off label use of metformin in pregnancy. She'll channel further decisions via her fertility specialist\par RTC 6 months, or sooner once gets pregnant\par \par \par

## 2021-07-16 ENCOUNTER — TRANSCRIPTION ENCOUNTER (OUTPATIENT)
Age: 41
End: 2021-07-16

## 2021-07-16 LAB
T4 FREE SERPL-MCNC: 1.9 NG/DL
TSH SERPL-ACNC: 0.1 UIU/ML

## 2021-09-08 ENCOUNTER — APPOINTMENT (OUTPATIENT)
Dept: ENDOCRINOLOGY | Facility: CLINIC | Age: 41
End: 2021-09-08
Payer: COMMERCIAL

## 2021-09-08 VITALS
BODY MASS INDEX: 37.32 KG/M2 | HEART RATE: 108 BPM | OXYGEN SATURATION: 98 % | WEIGHT: 224 LBS | DIASTOLIC BLOOD PRESSURE: 60 MMHG | HEIGHT: 65 IN | RESPIRATION RATE: 16 BRPM | SYSTOLIC BLOOD PRESSURE: 110 MMHG | TEMPERATURE: 97.4 F

## 2021-09-08 LAB
ALBUMIN SERPL ELPH-MCNC: 4.5 G/DL
ALP BLD-CCNC: 41 U/L
ALT SERPL-CCNC: 24 U/L
ANION GAP SERPL CALC-SCNC: 19 MMOL/L
AST SERPL-CCNC: 18 U/L
BILIRUB SERPL-MCNC: 0.7 MG/DL
BUN SERPL-MCNC: 13 MG/DL
CALCIUM SERPL-MCNC: 9.7 MG/DL
CHLORIDE SERPL-SCNC: 102 MMOL/L
CO2 SERPL-SCNC: 18 MMOL/L
CREAT SERPL-MCNC: 0.8 MG/DL
ESTIMATED AVERAGE GLUCOSE: 103 MG/DL
FOLATE SERPL-MCNC: >20 NG/ML
GLUCOSE SERPL-MCNC: 81 MG/DL
HBA1C MFR BLD HPLC: 5.2 %
POTASSIUM SERPL-SCNC: 4.5 MMOL/L
PROT SERPL-MCNC: 7.2 G/DL
SODIUM SERPL-SCNC: 139 MMOL/L
T4 FREE SERPL-MCNC: 1.7 NG/DL
THYROGLOB AB SERPL-ACNC: 240 IU/ML
THYROPEROXIDASE AB SERPL IA-ACNC: 35 IU/ML
TSH SERPL-ACNC: 0.02 UIU/ML
VIT B12 SERPL-MCNC: 290 PG/ML

## 2021-09-08 PROCEDURE — 99214 OFFICE O/P EST MOD 30 MIN: CPT | Mod: 25

## 2021-09-08 PROCEDURE — 36415 COLL VENOUS BLD VENIPUNCTURE: CPT

## 2021-09-08 NOTE — HISTORY OF PRESENT ILLNESS
[FreeTextEntry1] : 40 year female  f/u for hypothyroidism management. \par \par *** Sep 08, 2021 ***\par \par on Synthroid 88 mcg\par feels fine on the new dose, no c/o\par \par \par *** Jul 15, 2021 ***\par \par feels well, no new c/o\par on Synthroid brand 100 mcg\par seeing fertility specialist\par \par HPI:\par Ms. FRENCH  was diagnosed with hypothyroidism in 09/20 in settings for fertility evaluation. She was started on L-thyroxine at that time. She got pregnant in 11/20, s/p TOP for trisomy 21. She also reports one prior miscarriage.  She's presently on a generic L-thyroxine 100 mcg. She's been also diagnosed with PCOS, and has been on metformin for the past 4 years. She's under care of the fertility specialist. She's a doing a pediatric ID fellowship at [facility].\par Nandini denies family history of thyroid cancer or history of radiation exposure to head and neck area in a childhood. Her mother is being treated for Hashimoto's thyroiditis. \par Labs from 01/21- TSH- 1.78\par Thyroid US (10/13/20)- heterogenous thyroid. no nodules

## 2021-09-08 NOTE — ASSESSMENT
[FreeTextEntry1] : - thyroid panel today\par - continue Synthroid 88 mcg, pending labs. \par - TSH goal pre-conception 2.5\par - PNV with 150 mcg iodine\par Proper intake of levothyroxine is reviewed in details, including on an empty stomach, with water only, at least one hour before taking any medications, vitamins, or supplements and three-four hours before taking iron or calcium.\par Patient is advised that if  becomes pregnant, should call immediately since the thyroid hormone dose may need to be adjusted.\par - of note, we discussed an off label use of metformin in pregnancy. She'll channel further decisions via her fertility specialist\par RTC 6 months, or sooner once gets pregnant

## 2021-09-24 ENCOUNTER — APPOINTMENT (OUTPATIENT)
Dept: FAMILY MEDICINE | Facility: CLINIC | Age: 41
End: 2021-09-24
Payer: COMMERCIAL

## 2021-09-24 VITALS
SYSTOLIC BLOOD PRESSURE: 114 MMHG | WEIGHT: 227 LBS | BODY MASS INDEX: 37.82 KG/M2 | OXYGEN SATURATION: 99 % | HEIGHT: 65 IN | TEMPERATURE: 97.9 F | DIASTOLIC BLOOD PRESSURE: 80 MMHG | HEART RATE: 83 BPM

## 2021-09-24 PROCEDURE — 99395 PREV VISIT EST AGE 18-39: CPT | Mod: 25

## 2021-09-24 PROCEDURE — 36415 COLL VENOUS BLD VENIPUNCTURE: CPT

## 2021-09-24 NOTE — ASSESSMENT
[FreeTextEntry1] : thyroid dysfunction\par Patient had a diagnosis of thyroid disorder. Blood was drawn to assess levels of TSH and T4. Patient will continue on current dose of medications at this time unless instructed otherwise. Patient was advised to take thyroid medications on a empty stomach and to wait at least 45 minutes before eating for appropriate absorption.\par patient went for us yesterday\par last tsh was 8, started on synthroid 75, feeling better, lost weight, \par last tsh is 4\par last pregnancy was terminated for downs syndrome at 14 weeks\par \par now seeing reproductive endo, just failed first IVF due to poor response\par \par \par PCOS\par on metformin

## 2021-09-24 NOTE — HEALTH RISK ASSESSMENT
[Excellent] : ~his/her~  mood as  excellent [] : No [Yes] : Yes [No falls in past year] : Patient reported no falls in the past year [0] : 2) Feeling down, depressed, or hopeless: Not at all (0) [PHQ-2 Negative - No further assessment needed] : PHQ-2 Negative - No further assessment needed [[id number]] : 0 [With Significant Other] : lives with significant other [Employed] : employed [] :  [Fully functional (bathing, dressing, toileting, transferring, walking, feeding)] : Fully functional (bathing, dressing, toileting, transferring, walking, feeding) [Fully functional (using the telephone, shopping, preparing meals, housekeeping, doing laundry, using] : Fully functional and needs no help or supervision to perform IADLs (using the telephone, shopping, preparing meals, housekeeping, doing laundry, using transportation, managing medications and managing finances) [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

## 2021-09-24 NOTE — HISTORY OF PRESENT ILLNESS
[de-identified] : 40 year old female  here for annual well visit. Patient's blood work was drawn and medications reviewed. Patient's past medical history was reviewed, allergies verified and problems were identified and assessed. Patients medications were reviewed. Patient is feeling well with no new or active complaints at this time.\par \par patient trying to conceive, last tsh was 8, started on synthroid, went down to 4

## 2021-09-25 LAB
25(OH)D3 SERPL-MCNC: 39.3 NG/ML
BASOPHILS # BLD AUTO: 0.02 K/UL
BASOPHILS NFR BLD AUTO: 0.3 %
CHOLEST SERPL-MCNC: 186 MG/DL
EOSINOPHIL # BLD AUTO: 0.04 K/UL
EOSINOPHIL NFR BLD AUTO: 0.6 %
HCT VFR BLD CALC: 46.5 %
HDLC SERPL-MCNC: 66 MG/DL
HGB BLD-MCNC: 14.9 G/DL
IMM GRANULOCYTES NFR BLD AUTO: 0.3 %
INSULIN P FAST SERPL-ACNC: 24.8 UU/ML
LDLC SERPL CALC-MCNC: 93 MG/DL
LYMPHOCYTES # BLD AUTO: 1.83 K/UL
LYMPHOCYTES NFR BLD AUTO: 26.7 %
MAN DIFF?: NORMAL
MCHC RBC-ENTMCNC: 29 PG
MCHC RBC-ENTMCNC: 32 GM/DL
MCV RBC AUTO: 90.6 FL
MONOCYTES # BLD AUTO: 0.41 K/UL
MONOCYTES NFR BLD AUTO: 6 %
NEUTROPHILS # BLD AUTO: 4.53 K/UL
NEUTROPHILS NFR BLD AUTO: 66.1 %
NONHDLC SERPL-MCNC: 120 MG/DL
PLATELET # BLD AUTO: 361 K/UL
RBC # BLD: 5.13 M/UL
RBC # FLD: 12.4 %
T4 FREE SERPL-MCNC: 1.4 NG/DL
TRIGL SERPL-MCNC: 134 MG/DL
TSH SERPL-ACNC: 0.49 UIU/ML
WBC # FLD AUTO: 6.85 K/UL

## 2021-09-27 ENCOUNTER — TRANSCRIPTION ENCOUNTER (OUTPATIENT)
Age: 41
End: 2021-09-27

## 2022-01-11 ENCOUNTER — RX RENEWAL (OUTPATIENT)
Age: 42
End: 2022-01-11

## 2022-03-14 ENCOUNTER — APPOINTMENT (OUTPATIENT)
Dept: ENDOCRINOLOGY | Facility: CLINIC | Age: 42
End: 2022-03-14
Payer: COMMERCIAL

## 2022-03-14 VITALS
HEIGHT: 65 IN | OXYGEN SATURATION: 99 % | RESPIRATION RATE: 16 BRPM | SYSTOLIC BLOOD PRESSURE: 122 MMHG | HEART RATE: 67 BPM | DIASTOLIC BLOOD PRESSURE: 70 MMHG | BODY MASS INDEX: 38.32 KG/M2 | WEIGHT: 230 LBS | TEMPERATURE: 97.8 F

## 2022-03-14 PROCEDURE — 36415 COLL VENOUS BLD VENIPUNCTURE: CPT

## 2022-03-14 PROCEDURE — 99214 OFFICE O/P EST MOD 30 MIN: CPT | Mod: 25

## 2022-03-14 NOTE — HISTORY OF PRESENT ILLNESS
[FreeTextEntry1] : 41 year female  f/u for hypothyroidism management. \par \par *** Mar 14, 2022 ***\par \par feels well. struggles with a weight loss. trying IF w/o any help. working out 6/week\par taking L-thyroxine 75 mcg, metformin er 750 mg bid\par \par \par *** Sep 08, 2021 ***\par \par on Synthroid 88 mcg\par feels fine on the new dose, no c/o\par \par \par *** Jul 15, 2021 ***\par \par feels well, no new c/o\par on Synthroid brand 100 mcg\par seeing fertility specialist\par \par HPI:\par Ms. FRENCH  was diagnosed with hypothyroidism in 09/20 in settings for fertility evaluation. She was started on L-thyroxine at that time. She got pregnant in 11/20, s/p TOP for trisomy 21. She also reports one prior miscarriage.  She's presently on a generic L-thyroxine 100 mcg. She's been also diagnosed with PCOS, and has been on metformin for the past 4 years. She's under care of the fertility specialist. She's a doing a pediatric ID fellowship at [facility].\par Nandini denies family history of thyroid cancer or history of radiation exposure to head and neck area in a childhood. Her mother is being treated for Hashimoto's thyroiditis. \par Labs from 01/21- TSH- 1.78\par Thyroid US (10/13/20)- heterogenous thyroid. no nodules

## 2022-03-14 NOTE — ASSESSMENT
[FreeTextEntry1] : - thyroid panel today\par - continue Synthroid 75 mcg, pending labs. \par - TSH goal pre-conception 2.5\par - PNV with 150 mcg iodine\par Proper intake of levothyroxine is reviewed in details, including on an empty stomach, with water only, at least one hour before taking any medications, vitamins, or supplements and three-four hours before taking iron or calcium.\par Patient is advised that if  becomes pregnant, should call immediately since the thyroid hormone dose may need to be adjusted.\par - of note, we discussed an off label use of metformin in pregnancy. She'll channel further decisions via her fertility specialist\par RTC 6 months, or sooner once gets pregnant

## 2022-03-18 ENCOUNTER — TRANSCRIPTION ENCOUNTER (OUTPATIENT)
Age: 42
End: 2022-03-18

## 2022-03-18 LAB
25(OH)D3 SERPL-MCNC: 25.2 NG/ML
ALBUMIN SERPL ELPH-MCNC: 4.6 G/DL
ALP BLD-CCNC: 39 U/L
ALT SERPL-CCNC: 23 U/L
ANION GAP SERPL CALC-SCNC: 10 MMOL/L
AST SERPL-CCNC: 15 U/L
BILIRUB SERPL-MCNC: 0.6 MG/DL
BUN SERPL-MCNC: 13 MG/DL
CALCIUM SERPL-MCNC: 9.8 MG/DL
CHLORIDE SERPL-SCNC: 104 MMOL/L
CO2 SERPL-SCNC: 23 MMOL/L
CREAT SERPL-MCNC: 0.8 MG/DL
EGFR: 96 ML/MIN/1.73M2
ESTIMATED AVERAGE GLUCOSE: 100 MG/DL
GLUCOSE SERPL-MCNC: 88 MG/DL
HBA1C MFR BLD HPLC: 5.1 %
POTASSIUM SERPL-SCNC: 4.8 MMOL/L
PROT SERPL-MCNC: 6.9 G/DL
SODIUM SERPL-SCNC: 137 MMOL/L
T4 FREE SERPL-MCNC: 1.3 NG/DL
TSH SERPL-ACNC: 3.19 UIU/ML

## 2022-03-23 ENCOUNTER — TRANSCRIPTION ENCOUNTER (OUTPATIENT)
Age: 42
End: 2022-03-23

## 2022-08-02 NOTE — HISTORY OF PRESENT ILLNESS
[facility]  Establish care visit   2022    Elly Borrego (:  1973) is a 54 transcription that are not intended. [de-identified] : 39 year old female here to establish care and for a full physical examination. The patients complete medical, family and social history was documented and reviewed with the patient.  The patients medications were identified and also reviewed with the patient as well as any allergies to any medications. All active and previous medical problems were identified and discussed with the patient.  Any new problems were documented. Patient is feeling well today.\par

## 2022-08-22 ENCOUNTER — TRANSCRIPTION ENCOUNTER (OUTPATIENT)
Age: 42
End: 2022-08-22

## 2022-08-29 ENCOUNTER — APPOINTMENT (OUTPATIENT)
Dept: ENDOCRINOLOGY | Facility: CLINIC | Age: 42
End: 2022-08-29

## 2022-08-29 VITALS
OXYGEN SATURATION: 99 % | HEART RATE: 80 BPM | TEMPERATURE: 97.3 F | HEIGHT: 65 IN | DIASTOLIC BLOOD PRESSURE: 70 MMHG | BODY MASS INDEX: 38.15 KG/M2 | SYSTOLIC BLOOD PRESSURE: 118 MMHG | RESPIRATION RATE: 16 BRPM | WEIGHT: 229 LBS

## 2022-08-29 LAB
25(OH)D3 SERPL-MCNC: 35.3 NG/ML
ALBUMIN SERPL ELPH-MCNC: 4.6 G/DL
ALP BLD-CCNC: 46 U/L
ALT SERPL-CCNC: 27 U/L
ANION GAP SERPL CALC-SCNC: 13 MMOL/L
AST SERPL-CCNC: 18 U/L
BILIRUB SERPL-MCNC: 0.6 MG/DL
BUN SERPL-MCNC: 9 MG/DL
CALCIUM SERPL-MCNC: 10.2 MG/DL
CHLORIDE SERPL-SCNC: 105 MMOL/L
CO2 SERPL-SCNC: 21 MMOL/L
CREAT SERPL-MCNC: 0.8 MG/DL
EGFR: 96 ML/MIN/1.73M2
GLUCOSE SERPL-MCNC: 92 MG/DL
POTASSIUM SERPL-SCNC: 4.8 MMOL/L
PROT SERPL-MCNC: 7.2 G/DL
SODIUM SERPL-SCNC: 139 MMOL/L
T3 SERPL-MCNC: 112 NG/DL
T4 FREE SERPL-MCNC: 1.6 NG/DL
T4 SERPL-MCNC: 11.6 UG/DL
TSH SERPL-ACNC: 1.63 UIU/ML

## 2022-08-29 PROCEDURE — 99214 OFFICE O/P EST MOD 30 MIN: CPT | Mod: 25

## 2022-08-29 PROCEDURE — 36415 COLL VENOUS BLD VENIPUNCTURE: CPT

## 2022-08-29 NOTE — HISTORY OF PRESENT ILLNESS
[FreeTextEntry1] : 41 year female  f/u for hypothyroidism management. \par \par *** Aug 29, 2022 ***\par \par IUP ~ 5 weeks. 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 struggles with a weight loss. trying IF w/o any help. working out 6/week\par taking L-thyroxine 75 mcg, metformin er 750 mg bid\par \par \par *** Sep 08, 2021 ***\par \par on Synthroid 88 mcg\par feels fine on the new dose, no c/o\par \par \par *** Jul 15, 2021 ***\par \par feels well, no new c/o\par on Synthroid brand 100 mcg\par seeing fertility specialist\par \par HPI:\par Ms. FRENCH  was diagnosed with hypothyroidism in 09/20 in settings for fertility evaluation. She was started on L-thyroxine at that time. She got pregnant in 11/20, s/p TOP for trisomy 21. She also reports one prior miscarriage.  She's presently on a generic L-thyroxine 100 mcg. She's been also diagnosed with PCOS, and has been on metformin for the past 4 years. She's under care of the fertility specialist. 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 Her mother is being treated for Hashimoto's thyroiditis. \par Labs from 01/21- TSH- 1.78\par Thyroid US (10/13/20)- heterogenous thyroid. no nodules

## 2022-08-29 NOTE — ASSESSMENT
[FreeTextEntry1] : - thyroid panel today\par - continue Synthroid 88 mcg, pending labs. \par - TSH goal pre-conception 2.5\par - PNV with 150 mcg iodine\par - of note, we discussed an off label use of metformin in pregnancy. She'll channel further decisions via her ob\par labs today and in 4-6 weeks

## 2022-08-30 ENCOUNTER — TRANSCRIPTION ENCOUNTER (OUTPATIENT)
Age: 42
End: 2022-08-30

## 2022-08-30 LAB
ESTIMATED AVERAGE GLUCOSE: 103 MG/DL
HBA1C MFR BLD HPLC: 5.2 %
THYROGLOB AB SERPL-ACNC: 25.9 IU/ML
THYROPEROXIDASE AB SERPL IA-ACNC: 15.1 IU/ML

## 2022-09-05 NOTE — H&P PST ADULT - EYES
SUBJECTIVE:    Patient ID: Scott is a 22 month old male. History obtained from {Historians:[id number]}.    Chief Complaint:  No chief complaint on file.    History of Present Illness:          Patient Active Problem List   Diagnosis   • Infantile atopic dermatitis   • Rash of toe   • Skull deformity       Social History     Social History Narrative   • Not on file     No current outpatient medications on file.     No current facility-administered medications for this visit.     ALLERGIES:  No Known Allergies    All other review of systems is negative.     OBJECTIVE:  There were no vitals taken for this visit.    General:  Well appearing  HEENT:  OP:normal, pink, Tonsils: normal, No LAD  TMs: Normal   Chest: CTA  CV:  S1S2nl, RRR, No MGR      No results found for this or any previous visit (from the past 168 hour(s)).    ASSESSMENT & PLAN:  There are no diagnoses linked to this encounter.       I discussed diagnosis and patient instructions listed on AVS.  I instructed family to call if problem worsens or does not improve within the next few days.       Due to the declared public health emergency during the COVID-19 pandemic, additional supplies, materials, and preparation time have been required and provided by the physician or APN and/or clinical staff over and above those usually included in an office visit.      Laura Roberts MD  [facility]    9/5/2022   
EOMI; PERRL; no drainage or redness

## 2022-09-14 ENCOUNTER — APPOINTMENT (OUTPATIENT)
Dept: ENDOCRINOLOGY | Facility: CLINIC | Age: 42
End: 2022-09-14

## 2022-09-19 ENCOUNTER — RESULT REVIEW (OUTPATIENT)
Age: 42
End: 2022-09-19

## 2022-10-02 NOTE — H&P PST ADULT - SPO2 (%)
Pt discharged per MD orders.  Tele discontinued and returned to station.  IV discontinued; catheter tip intact x.  Medication list and prescriptions reviewed; prescriptions filled at outpatient pharmacy.  Pt verbalizes understanding of all written and verbal discharge instructions.  Pt awaiting family/escort arrival.  Will continue to monitor.    
Pt phos is <1 notified md via secure chat awaiting response.  
98

## 2022-10-06 ENCOUNTER — TRANSCRIPTION ENCOUNTER (OUTPATIENT)
Age: 42
End: 2022-10-06

## 2022-10-17 ENCOUNTER — APPOINTMENT (OUTPATIENT)
Dept: FAMILY MEDICINE | Facility: CLINIC | Age: 42
End: 2022-10-17

## 2022-10-17 VITALS
WEIGHT: 235 LBS | HEART RATE: 85 BPM | DIASTOLIC BLOOD PRESSURE: 75 MMHG | BODY MASS INDEX: 39.15 KG/M2 | OXYGEN SATURATION: 99 % | SYSTOLIC BLOOD PRESSURE: 122 MMHG | HEIGHT: 65 IN | TEMPERATURE: 97.8 F

## 2022-10-17 DIAGNOSIS — E07.9 DISORDER OF THYROID, UNSPECIFIED: ICD-10-CM

## 2022-10-17 DIAGNOSIS — Z00.00 ENCOUNTER FOR GENERAL ADULT MEDICAL EXAMINATION W/OUT ABNORMAL FINDINGS: ICD-10-CM

## 2022-10-17 DIAGNOSIS — Z60.2 PROBLEMS RELATED TO LIVING ALONE: ICD-10-CM

## 2022-10-17 DIAGNOSIS — Z3A.12 12 WEEKS GESTATION OF PREGNANCY: ICD-10-CM

## 2022-10-17 DIAGNOSIS — Z83.49 FAMILY HISTORY OF OTHER ENDOCRINE, NUTRITIONAL AND METABOLIC DISEASES: ICD-10-CM

## 2022-10-17 DIAGNOSIS — Z09 ENCOUNTER FOR FOLLOW-UP EXAMINATION AFTER COMPLETED TREATMENT FOR CONDITIONS OTHER THAN MALIGNANT NEOPLASM: ICD-10-CM

## 2022-10-17 PROCEDURE — 99385 PREV VISIT NEW AGE 18-39: CPT

## 2022-10-17 RX ORDER — METFORMIN ER 750 MG 750 MG/1
750 TABLET ORAL
Qty: 180 | Refills: 1 | Status: COMPLETED | COMMUNITY
Start: 2020-09-09 | End: 2022-09-17

## 2022-10-17 RX ORDER — CHROMIUM 200 MCG
TABLET ORAL
Refills: 0 | Status: ACTIVE | COMMUNITY

## 2022-10-17 RX ORDER — ASPIRIN/CALCIUM/MAG/ALUMINUM 81 MG
81 TABLET, DELAYED RELEASE (ENTERIC COATED) ORAL
Refills: 0 | Status: COMPLETED | COMMUNITY
End: 2022-10-17

## 2022-10-17 RX ORDER — ASPIRIN 325 MG/1
TABLET, FILM COATED ORAL
Refills: 0 | Status: ACTIVE | COMMUNITY

## 2022-10-17 SDOH — SOCIAL STABILITY - SOCIAL INSECURITY: PROBLEMS RELATED TO LIVING ALONE: Z60.2

## 2022-10-17 NOTE — HISTORY OF PRESENT ILLNESS
[FreeTextEntry1] : Annual wellness exam, establish care  [de-identified] : 40 yo F with PMHx of Hashimoto's Thyroiditis and PCOS presents to clinic for her annual wellness exam and to establish primary care. Pt is currently managed by her Endocrinologist for her thyroid and PCOS. Pt is a  at 12d5w conceived naturally s/p 1 course of IVF failure. 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 She current lives alone as her  is in his second year of his Family Medicine rotation in [place]. Pt states that is has been difficult to be apart, particularly this past July, but is feeling well currently. Pt endorses feeling nauseous during the first trimester, but her symptoms are improving and she is now able to incorporate more foods into her diet. Pt endorses increased exercise tolerance as well. Pt has no complaints at this time.

## 2022-10-17 NOTE — REVIEW OF SYSTEMS
[Frequency] : frequency [Insomnia] : insomnia [Fever] : no fever [Chills] : no chills [Fatigue] : no fatigue [Hot Flashes] : no hot flashes [Night Sweats] : no night sweats [Discharge] : no discharge [Pain] : no pain [Redness] : no redness [Dryness] : no dryness  [Vision Problems] : no vision problems [Earache] : no earache [Hearing Loss] : no hearing loss [Hoarseness] : no hoarseness [Nasal Discharge] : no nasal discharge [Sore Throat] : no sore throat [Chest Pain] : no chest pain [Palpitations] : no palpitations [Leg Claudication] : no leg claudication [Lower Ext Edema] : no lower extremity edema [Shortness Of Breath] : no shortness of breath [Wheezing] : no wheezing [Cough] : no cough [Dyspnea on Exertion] : no dyspnea on exertion [Abdominal Pain] : no abdominal pain [Nausea] : no nausea [Constipation] : no constipation [Diarrhea] : diarrhea [Vomiting] : no vomiting [Heartburn] : no heartburn [Dysuria] : no dysuria [Incontinence] : no incontinence [Nocturia] : no nocturia [Hematuria] : no hematuria [Vaginal Discharge] : no vaginal discharge [Dysmenorrhea] : no dysmenorrhea [Joint Pain] : no joint pain [Joint Stiffness] : no joint stiffness [Joint Swelling] : no joint swelling [Muscle Weakness] : no muscle weakness [Muscle Pain] : no muscle pain [Back Pain] : no back pain [Itching] : no itching [Mole Changes] : no mole changes [Nail Changes] : no nail changes [Hair Changes] : no hair changes [Skin Rash] : no skin rash [Headache] : no headache [Dizziness] : no dizziness [Fainting] : no fainting [Memory Loss] : no memory loss [Suicidal] : not suicidal [Anxiety] : no anxiety [Depression] : no depression [Easy Bleeding] : no easy bleeding [Easy Bruising] : no easy bruising [Swollen Glands] : no swollen glands

## 2022-10-17 NOTE — HEALTH RISK ASSESSMENT
[Very Good] : ~his/her~  mood as very good [Never] : Never [No] : In the past 12 months have you used drugs other than those required for medical reasons? No [0] : 2) Feeling down, depressed, or hopeless: Not at all (0) [PHQ-2 Negative - No further assessment needed] : PHQ-2 Negative - No further assessment needed [Patient reported PAP Smear was normal] : Patient reported PAP Smear was normal [None] : None [Alone] : lives alone [Employed] : employed [] :  [Sexually Active] : sexually active [Fully functional (bathing, dressing, toileting, transferring, walking, feeding)] : Fully functional (bathing, dressing, toileting, transferring, walking, feeding) [Fully functional (using the telephone, shopping, preparing meals, housekeeping, doing laundry, using] : Fully functional and needs no help or supervision to perform IADLs (using the telephone, shopping, preparing meals, housekeeping, doing laundry, using transportation, managing medications and managing finances) [Seat Belt] :  uses seat belt [Patient/Caregiver not ready to engage] : , patient/caregiver not ready to engage [[id number]] : 0 [Change in mental status noted] : No change in mental status noted [Language] : denies difficulty with language [Behavior] : denies difficulty with behavior [Reasoning] : denies difficulty with reasoning [High Risk Behavior] : no high risk behavior [PapSmearDate] : 9/2022

## 2022-10-17 NOTE — PHYSICAL EXAM
[No Acute Distress] : no acute distress [Well Nourished] : well nourished [Well Developed] : well developed [Well-Appearing] : well-appearing [Normal Outer Ear/Nose] : the outer ears and nose were normal in appearance [Normal Oropharynx] : the oropharynx was normal [No JVD] : no jugular venous distention [No Respiratory Distress] : no respiratory distress  [No Accessory Muscle Use] : no accessory muscle use [Clear to Auscultation] : lungs were clear to auscultation bilaterally [Normal] : normal gait, coordination grossly intact, no focal deficits and deep tendon reflexes were 2+ and symmetric [Normal Affect] : the affect was normal [Normal Insight/Judgement] : insight and judgment were intact [Normal TMs] : both tympanic membranes were normal [de-identified] : mild submandibular enlargement, nontender  [de-identified] : gravid uterus palpable below ublicus

## 2022-10-17 NOTE — PLAN
[FreeTextEntry1] : 40 yo F with PMHx of Hashimoto's Thyroiditis and PCOS presents to clinic for her annual wellness exam and to establish primary care.\par -  Reviewed labs ordered by endocrinology with patient. \par - Labs sent to establish baseline here at our clinic. \par - pap up to date\par - flu shot and covid booster up to date\par \par Currently pregnant - following with OB, doing well\par - now 12 and 5

## 2022-10-17 NOTE — PAST MEDICAL HISTORY
[Menstruating] : menstruating [Regular Cycle Intervals] : have been regular [Total Preg ___] : G[unfilled] [Live Births ___] : P[unfilled]  [Abortions ___] : Abortions:[unfilled] [AB Induced ___] : elective abortions: [unfilled]  [AB Spont ___] : miscarriages: [unfilled]

## 2022-10-19 ENCOUNTER — TRANSCRIPTION ENCOUNTER (OUTPATIENT)
Age: 42
End: 2022-10-19

## 2022-10-19 LAB
T3 SERPL-MCNC: 138 NG/DL
T4 FREE SERPL-MCNC: 1.2 NG/DL
T4 SERPL-MCNC: 10.8 UG/DL
TSH SERPL-ACNC: 0.79 UIU/ML

## 2022-10-24 ENCOUNTER — TRANSCRIPTION ENCOUNTER (OUTPATIENT)
Age: 42
End: 2022-10-24

## 2022-10-24 LAB
BASOPHILS # BLD AUTO: 0.02 K/UL
BASOPHILS NFR BLD AUTO: 0.2 %
EOSINOPHIL # BLD AUTO: 0.06 K/UL
EOSINOPHIL NFR BLD AUTO: 0.7 %
HCT VFR BLD CALC: 40.6 %
HGB BLD-MCNC: 13.4 G/DL
IMM GRANULOCYTES NFR BLD AUTO: 0.3 %
LYMPHOCYTES # BLD AUTO: 2.08 K/UL
LYMPHOCYTES NFR BLD AUTO: 23 %
MAN DIFF?: NORMAL
MCHC RBC-ENTMCNC: 29.5 PG
MCHC RBC-ENTMCNC: 33 GM/DL
MCV RBC AUTO: 89.2 FL
MONOCYTES # BLD AUTO: 0.52 K/UL
MONOCYTES NFR BLD AUTO: 5.8 %
NEUTROPHILS # BLD AUTO: 6.32 K/UL
NEUTROPHILS NFR BLD AUTO: 70 %
PLATELET # BLD AUTO: 387 K/UL
RBC # BLD: 4.55 M/UL
RBC # FLD: 12.9 %
WBC # FLD AUTO: 9.03 K/UL

## 2022-11-28 ENCOUNTER — APPOINTMENT (OUTPATIENT)
Dept: ENDOCRINOLOGY | Facility: CLINIC | Age: 42
End: 2022-11-28

## 2022-11-28 VITALS
HEART RATE: 90 BPM | SYSTOLIC BLOOD PRESSURE: 118 MMHG | WEIGHT: 242 LBS | OXYGEN SATURATION: 95 % | DIASTOLIC BLOOD PRESSURE: 78 MMHG | TEMPERATURE: 98 F | BODY MASS INDEX: 40.32 KG/M2 | RESPIRATION RATE: 16 BRPM | HEIGHT: 65 IN

## 2022-11-28 DIAGNOSIS — E28.2 POLYCYSTIC OVARIAN SYNDROME: ICD-10-CM

## 2022-11-28 LAB
ESTIMATED AVERAGE GLUCOSE: 103 MG/DL
HBA1C MFR BLD HPLC: 5.2 %
T3 SERPL-MCNC: 192 NG/DL
T4 FREE SERPL-MCNC: 1 NG/DL
T4 SERPL-MCNC: 10.8 UG/DL
TSH SERPL-ACNC: 1.71 UIU/ML

## 2022-11-28 PROCEDURE — 36415 COLL VENOUS BLD VENIPUNCTURE: CPT

## 2022-11-28 PROCEDURE — 99213 OFFICE O/P EST LOW 20 MIN: CPT | Mod: 25

## 2022-11-28 NOTE — ASSESSMENT
[FreeTextEntry1] : - thyroid panel today\par - continue Synthroid 100 mcg, pending labs. \par - TSH goal pre-conception 2.5\par - PNV with 150 mcg iodine\par - TFT's in 2 months and have TEB after

## 2022-11-28 NOTE — HISTORY OF PRESENT ILLNESS
[FreeTextEntry1] : 42 year female  f/u for hypothyroidism management. \par \par *** Nov 28, 2022 ***\par \par feels well.  IUP ~ 18 wks (baby boy)\par fetal sonos are fine\par genetic tests are fine, not going through amnio\par on L-thyroxine 88 mcg, stopped Metformin\par passed OGTT\par \par \par *** Aug 29, 2022 ***\par \par IUP ~ 5 weeks. 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 struggles with a weight loss. trying IF w/o any help. working out 6/week\par taking L-thyroxine 75 mcg, metformin er 750 mg bid\par \par \par *** Sep 08, 2021 ***\par \par on Synthroid 88 mcg\par feels fine on the new dose, no c/o\par \par \par *** Jul 15, 2021 ***\par \par feels well, no new c/o\par on Synthroid brand 100 mcg\par seeing fertility specialist\par \par HPI:\par Ms. FRENCH  was diagnosed with hypothyroidism in 09/20 in settings for fertility evaluation. She was started on L-thyroxine at that time. She got pregnant in 11/20, s/p TOP for trisomy 21. She also reports one prior miscarriage.  She's presently on a generic L-thyroxine 100 mcg. She's been also diagnosed with PCOS, and has been on metformin for the past 4 years. She's under care of the fertility specialist. 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 Her mother is being treated for Hashimoto's thyroiditis. \par Labs from 01/21- TSH- 1.78\par Thyroid US (10/13/20)- heterogenous thyroid. no nodules

## 2022-11-29 LAB
THYROGLOB AB SERPL-ACNC: <20 IU/ML
THYROPEROXIDASE AB SERPL IA-ACNC: <10 IU/ML

## 2022-11-30 ENCOUNTER — NON-APPOINTMENT (OUTPATIENT)
Age: 42
End: 2022-11-30

## 2022-12-14 ENCOUNTER — ASOB RESULT (OUTPATIENT)
Age: 42
End: 2022-12-14

## 2022-12-14 ENCOUNTER — APPOINTMENT (OUTPATIENT)
Dept: ANTEPARTUM | Facility: CLINIC | Age: 42
End: 2022-12-14

## 2022-12-14 PROCEDURE — 76811 OB US DETAILED SNGL FETUS: CPT

## 2022-12-21 ENCOUNTER — ASOB RESULT (OUTPATIENT)
Age: 42
End: 2022-12-21

## 2022-12-21 ENCOUNTER — APPOINTMENT (OUTPATIENT)
Dept: ANTEPARTUM | Facility: CLINIC | Age: 42
End: 2022-12-21
Payer: COMMERCIAL

## 2022-12-21 PROCEDURE — 76816 OB US FOLLOW-UP PER FETUS: CPT

## 2023-02-06 ENCOUNTER — TRANSCRIPTION ENCOUNTER (OUTPATIENT)
Age: 43
End: 2023-02-06

## 2023-03-20 ENCOUNTER — TRANSCRIPTION ENCOUNTER (OUTPATIENT)
Age: 43
End: 2023-03-20

## 2023-03-22 ENCOUNTER — TRANSCRIPTION ENCOUNTER (OUTPATIENT)
Age: 43
End: 2023-03-22

## 2023-03-22 LAB
T3 SERPL-MCNC: 201 NG/DL
T4 FREE SERPL-MCNC: 1.1 NG/DL
T4 SERPL-MCNC: 11.5 UG/DL
TSH SERPL-ACNC: 0.45 UIU/ML

## 2023-03-23 ENCOUNTER — TRANSCRIPTION ENCOUNTER (OUTPATIENT)
Age: 43
End: 2023-03-23

## 2023-04-09 ENCOUNTER — OUTPATIENT (OUTPATIENT)
Dept: OUTPATIENT SERVICES | Facility: HOSPITAL | Age: 43
LOS: 1 days | End: 2023-04-09
Payer: COMMERCIAL

## 2023-04-09 VITALS — HEART RATE: 105 BPM | OXYGEN SATURATION: 98 %

## 2023-04-09 VITALS — TEMPERATURE: 98 F

## 2023-04-09 DIAGNOSIS — Z98.890 OTHER SPECIFIED POSTPROCEDURAL STATES: Chronic | ICD-10-CM

## 2023-04-09 DIAGNOSIS — O26.899 OTHER SPECIFIED PREGNANCY RELATED CONDITIONS, UNSPECIFIED TRIMESTER: ICD-10-CM

## 2023-04-09 PROCEDURE — 59025 FETAL NON-STRESS TEST: CPT

## 2023-04-09 PROCEDURE — G0463: CPT

## 2023-04-09 PROCEDURE — 99212 OFFICE O/P EST SF 10 MIN: CPT

## 2023-04-09 NOTE — OB PROVIDER TRIAGE NOTE - HISTORY OF PRESENT ILLNESS
OB PA Triage Note    43 yo  @ 37w4d by EDC of  presents with decreased fetal movement since this AM. Pt states she felt ample movement on the drive to triage and is much less concerned at this time    Denies LOF, VB or CTX    PNC: uncomplicated  GBS positive  EFW 6lbs14 oz by 35 week sonogram    PMH: hypothyroidism, HSVII    Meds: PNV, Valtrex, synthroid 100mcg daily  All: NKA    GYN: h/o genital HSV, and PCOS  denies fibroids/cysts/abn pap    OB:  TOP @ 14 weeks - D&E  2019 SAB @ 8 weeks    PSH: D&E   Knee ACL repair   egg retrieval     Social: denies toxic habits  Psych: denies history

## 2023-04-09 NOTE — OB RN TRIAGE NOTE - FALL HARM RISK - UNIVERSAL INTERVENTIONS
Bed in lowest position, wheels locked, appropriate side rails in place/Call bell, personal items and telephone in reach/Instruct patient to call for assistance before getting out of bed or chair/Non-slip footwear when patient is out of bed/[place] to call system/Physically safe environment - no spills, clutter or unnecessary equipment/Purposeful Proactive Rounding/Room/bathroom lighting operational, light cord in reach

## 2023-04-09 NOTE — OB PROVIDER TRIAGE NOTE - NSOBPROVIDERNOTE_OBGYN_ALL_OB_FT
41 yo P0 @ 37w4 days presents with dec FM 43 yo P0 @ 37w4 days presents with dec FM - BPP 10/10. Pt reassured  - discharge home with routine prenatal f/u, next appt 4/12  - labor precautions given  - discussed kick counts  - return if dec FM, LOF, VB or labor    d/w Dr Keenan Crawford, PA

## 2023-04-09 NOTE — OB PROVIDER TRIAGE NOTE - NSHPPHYSICALEXAM_GEN_ALL_CORE
ICU Vital Signs Last 24 Hrs  T(C): --  T(F): --  HR: 82 (09 Apr 2023 15:06) (82 - 106)  BP: 122/72 (09 Apr 2023 15:03) (122/72 - 129/80)  BP(mean): --  ABP: --  ABP(mean): --  RR: 18 (09 Apr 2023 14:56) (18 - 18)  SpO2: 97% (09 Apr 2023 15:06) (97% - 98%)    O2 Parameters below as of 09 Apr 2023 14:52  Patient On (Oxygen Delivery Method): room air        Gen: NAD  Heart: S1S2 RRR  Lungs: CTA b/l  Abd: gravid NTND  LE: no calf tenderness    FHT: reactive NST  [place]: irregular ICU Vital Signs Last 24 Hrs  T(C): --  T(F): --  HR: 82 (09 Apr 2023 15:06) (82 - 106)  BP: 122/72 (09 Apr 2023 15:03) (122/72 - 129/80)  BP(mean): --  ABP: --  ABP(mean): --  RR: 18 (09 Apr 2023 14:56) (18 - 18)  SpO2: 97% (09 Apr 2023 15:06) (97% - 98%)    O2 Parameters below as of 09 Apr 2023 14:52  Patient On (Oxygen Delivery Method): room air        Gen: NAD  Heart: S1S2 RRR  Lungs: CTA b/l  Abd: gravid NTND  LE: no calf tenderness    FHT: reactive NST  [place]: irregular    BPP 8/8 vertex, MVP 5.73, pt saw movement, confirmed feeling movement and reassured

## 2023-04-11 DIAGNOSIS — O99.283 ENDOCRINE, NUTRITIONAL AND METABOLIC DISEASES COMPLICATING PREGNANCY, THIRD TRIMESTER: ICD-10-CM

## 2023-04-11 DIAGNOSIS — O36.8130 DECREASED FETAL MOVEMENTS, THIRD TRIMESTER, NOT APPLICABLE OR UNSPECIFIED: ICD-10-CM

## 2023-04-11 DIAGNOSIS — Z3A.37 37 WEEKS GESTATION OF PREGNANCY: ICD-10-CM

## 2023-04-11 DIAGNOSIS — O09.293 SUPERVISION OF PREGNANCY WITH OTHER POOR REPRODUCTIVE OR OBSTETRIC HISTORY, THIRD TRIMESTER: ICD-10-CM

## 2023-04-11 DIAGNOSIS — E03.9 HYPOTHYROIDISM, UNSPECIFIED: ICD-10-CM

## 2023-04-11 DIAGNOSIS — E28.2 POLYCYSTIC OVARIAN SYNDROME: ICD-10-CM

## 2023-04-11 DIAGNOSIS — O09.523 SUPERVISION OF ELDERLY MULTIGRAVIDA, THIRD TRIMESTER: ICD-10-CM

## 2023-04-19 ENCOUNTER — INPATIENT (INPATIENT)
Facility: HOSPITAL | Age: 43
LOS: 3 days | Discharge: HOME | End: 2023-04-23
Attending: OBSTETRICS & GYNECOLOGY | Admitting: OBSTETRICS & GYNECOLOGY
Payer: COMMERCIAL

## 2023-04-19 VITALS — HEART RATE: 118 BPM | DIASTOLIC BLOOD PRESSURE: 82 MMHG | SYSTOLIC BLOOD PRESSURE: 145 MMHG

## 2023-04-19 DIAGNOSIS — Z3A.39 39 WEEKS GESTATION OF PREGNANCY: ICD-10-CM

## 2023-04-19 DIAGNOSIS — O09.523 SUPERVISION OF ELDERLY MULTIGRAVIDA, THIRD TRIMESTER: ICD-10-CM

## 2023-04-19 DIAGNOSIS — Z98.890 OTHER SPECIFIED POSTPROCEDURAL STATES: Chronic | ICD-10-CM

## 2023-04-19 LAB
ALBUMIN SERPL ELPH-MCNC: 3.4 G/DL — SIGNIFICANT CHANGE UP (ref 3.3–5)
ALP SERPL-CCNC: 109 U/L — SIGNIFICANT CHANGE UP (ref 40–120)
ALT FLD-CCNC: 14 U/L — SIGNIFICANT CHANGE UP (ref 10–45)
ANION GAP SERPL CALC-SCNC: 11 MMOL/L — SIGNIFICANT CHANGE UP (ref 5–17)
APPEARANCE UR: CLEAR — SIGNIFICANT CHANGE UP
APTT BLD: 24.2 SEC — LOW (ref 27.5–35.5)
AST SERPL-CCNC: 20 U/L — SIGNIFICANT CHANGE UP (ref 10–40)
BACTERIA # UR AUTO: ABNORMAL
BASOPHILS # BLD AUTO: 0.01 K/UL — SIGNIFICANT CHANGE UP (ref 0–0.2)
BASOPHILS NFR BLD AUTO: 0.2 % — SIGNIFICANT CHANGE UP (ref 0–2)
BILIRUB SERPL-MCNC: 0.3 MG/DL — SIGNIFICANT CHANGE UP (ref 0.2–1.2)
BILIRUB UR-MCNC: NEGATIVE — SIGNIFICANT CHANGE UP
BLD GP AB SCN SERPL QL: NEGATIVE — SIGNIFICANT CHANGE UP
BUN SERPL-MCNC: 14 MG/DL — SIGNIFICANT CHANGE UP (ref 7–23)
CALCIUM SERPL-MCNC: 9.9 MG/DL — SIGNIFICANT CHANGE UP (ref 8.4–10.5)
CHLORIDE SERPL-SCNC: 106 MMOL/L — SIGNIFICANT CHANGE UP (ref 96–108)
CO2 SERPL-SCNC: 23 MMOL/L — SIGNIFICANT CHANGE UP (ref 22–31)
COLOR SPEC: SIGNIFICANT CHANGE UP
CREAT ?TM UR-MCNC: 116 MG/DL — SIGNIFICANT CHANGE UP
CREAT SERPL-MCNC: 0.62 MG/DL — SIGNIFICANT CHANGE UP (ref 0.5–1.3)
DIFF PNL FLD: NEGATIVE — SIGNIFICANT CHANGE UP
EGFR: 115 ML/MIN/1.73M2 — SIGNIFICANT CHANGE UP
EOSINOPHIL # BLD AUTO: 0.04 K/UL — SIGNIFICANT CHANGE UP (ref 0–0.5)
EOSINOPHIL NFR BLD AUTO: 0.6 % — SIGNIFICANT CHANGE UP (ref 0–6)
EPI CELLS # UR: 4 /HPF — SIGNIFICANT CHANGE UP
FIBRINOGEN PPP-MCNC: 469 MG/DL — HIGH (ref 200–445)
GLUCOSE SERPL-MCNC: 96 MG/DL — SIGNIFICANT CHANGE UP (ref 70–99)
GLUCOSE UR QL: NEGATIVE — SIGNIFICANT CHANGE UP
HCT VFR BLD CALC: 38.4 % — SIGNIFICANT CHANGE UP (ref 34.5–45)
HGB BLD-MCNC: 12.9 G/DL — SIGNIFICANT CHANGE UP (ref 11.5–15.5)
HYALINE CASTS # UR AUTO: 0 /LPF — SIGNIFICANT CHANGE UP (ref 0–7)
IMM GRANULOCYTES NFR BLD AUTO: 0.5 % — SIGNIFICANT CHANGE UP (ref 0–0.9)
INR BLD: 0.87 RATIO — LOW (ref 0.88–1.16)
KETONES UR-MCNC: NEGATIVE — SIGNIFICANT CHANGE UP
LDH SERPL L TO P-CCNC: 135 U/L — SIGNIFICANT CHANGE UP (ref 50–242)
LEUKOCYTE ESTERASE UR-ACNC: ABNORMAL
LYMPHOCYTES # BLD AUTO: 1.22 K/UL — SIGNIFICANT CHANGE UP (ref 1–3.3)
LYMPHOCYTES # BLD AUTO: 18.8 % — SIGNIFICANT CHANGE UP (ref 13–44)
MCHC RBC-ENTMCNC: 30.5 PG — SIGNIFICANT CHANGE UP (ref 27–34)
MCHC RBC-ENTMCNC: 33.6 GM/DL — SIGNIFICANT CHANGE UP (ref 32–36)
MCV RBC AUTO: 90.8 FL — SIGNIFICANT CHANGE UP (ref 80–100)
MONOCYTES # BLD AUTO: 0.44 K/UL — SIGNIFICANT CHANGE UP (ref 0–0.9)
MONOCYTES NFR BLD AUTO: 6.8 % — SIGNIFICANT CHANGE UP (ref 2–14)
NEUTROPHILS # BLD AUTO: 4.76 K/UL — SIGNIFICANT CHANGE UP (ref 1.8–7.4)
NEUTROPHILS NFR BLD AUTO: 73.1 % — SIGNIFICANT CHANGE UP (ref 43–77)
NITRITE UR-MCNC: NEGATIVE — SIGNIFICANT CHANGE UP
NRBC # BLD: 0 /100 WBCS — SIGNIFICANT CHANGE UP (ref 0–0)
PH UR: 6 — SIGNIFICANT CHANGE UP (ref 5–8)
PLATELET # BLD AUTO: 195 K/UL — SIGNIFICANT CHANGE UP (ref 150–400)
POTASSIUM SERPL-MCNC: 3.8 MMOL/L — SIGNIFICANT CHANGE UP (ref 3.5–5.3)
POTASSIUM SERPL-SCNC: 3.8 MMOL/L — SIGNIFICANT CHANGE UP (ref 3.5–5.3)
PROT ?TM UR-MCNC: 30 MG/DL — HIGH (ref 0–12)
PROT SERPL-MCNC: 6 G/DL — SIGNIFICANT CHANGE UP (ref 6–8.3)
PROT UR-MCNC: ABNORMAL
PROT/CREAT UR-RTO: 0.3 RATIO — HIGH (ref 0–0.2)
PROTHROM AB SERPL-ACNC: 10.1 SEC — LOW (ref 10.5–13.4)
RBC # BLD: 4.23 M/UL — SIGNIFICANT CHANGE UP (ref 3.8–5.2)
RBC # FLD: 13.8 % — SIGNIFICANT CHANGE UP (ref 10.3–14.5)
RBC CASTS # UR COMP ASSIST: 1 /HPF — SIGNIFICANT CHANGE UP (ref 0–4)
RH IG SCN BLD-IMP: POSITIVE — SIGNIFICANT CHANGE UP
SODIUM SERPL-SCNC: 140 MMOL/L — SIGNIFICANT CHANGE UP (ref 135–145)
SP GR SPEC: 1.02 — SIGNIFICANT CHANGE UP (ref 1.01–1.02)
URATE SERPL-MCNC: 5.6 MG/DL — SIGNIFICANT CHANGE UP (ref 2.5–7)
UROBILINOGEN FLD QL: NEGATIVE — SIGNIFICANT CHANGE UP
WBC # BLD: 6.5 K/UL — SIGNIFICANT CHANGE UP (ref 3.8–10.5)
WBC # FLD AUTO: 6.5 K/UL — SIGNIFICANT CHANGE UP (ref 3.8–10.5)
WBC UR QL: 5 /HPF — SIGNIFICANT CHANGE UP (ref 0–5)

## 2023-04-19 RX ORDER — OXYTOCIN 10 UNIT/ML
333.33 VIAL (ML) INJECTION
Qty: 20 | Refills: 0 | Status: DISCONTINUED | OUTPATIENT
Start: 2023-04-19 | End: 2023-04-21

## 2023-04-19 RX ORDER — SODIUM CHLORIDE 9 MG/ML
1000 INJECTION, SOLUTION INTRAVENOUS
Refills: 0 | Status: DISCONTINUED | OUTPATIENT
Start: 2023-04-19 | End: 2023-04-21

## 2023-04-19 RX ORDER — AMPICILLIN TRIHYDRATE 250 MG
1 CAPSULE ORAL EVERY 4 HOURS
Refills: 0 | Status: DISCONTINUED | OUTPATIENT
Start: 2023-04-19 | End: 2023-04-21

## 2023-04-19 RX ORDER — CHLORHEXIDINE GLUCONATE 213 G/1000ML
1 SOLUTION TOPICAL ONCE
Refills: 0 | Status: DISCONTINUED | OUTPATIENT
Start: 2023-04-19 | End: 2023-04-21

## 2023-04-19 RX ORDER — CITRIC ACID/SODIUM CITRATE 300-500 MG
15 SOLUTION, ORAL ORAL EVERY 6 HOURS
Refills: 0 | Status: DISCONTINUED | OUTPATIENT
Start: 2023-04-19 | End: 2023-04-21

## 2023-04-19 RX ORDER — AMPICILLIN TRIHYDRATE 250 MG
2 CAPSULE ORAL ONCE
Refills: 0 | Status: COMPLETED | OUTPATIENT
Start: 2023-04-19 | End: 2023-04-19

## 2023-04-19 RX ADMIN — Medication 200 GRAM(S): at 19:57

## 2023-04-19 NOTE — OB PROVIDER H&P - NSMODPPHRISK_OBGYN_A_OB
Over-distended uterus: Multiple gestation, polyhydramnios, Macrosomia/BMI > 40/AMA - over 35 years of age/Aspirin use in pregnancy

## 2023-04-19 NOTE — OB RN PATIENT PROFILE - FALL HARM RISK - UNIVERSAL INTERVENTIONS
Bed in lowest position, wheels locked, appropriate side rails in place/Call bell, personal items and telephone in reach/Instruct patient to call for assistance before getting out of bed or chair/Non-slip footwear when patient is out of bed/[place] to call system/Physically safe environment - no spills, clutter or unnecessary equipment/Purposeful Proactive Rounding/Room/bathroom lighting operational, light cord in reach

## 2023-04-19 NOTE — OB PROVIDER H&P - ASSESSMENT
A/P: 42yoF  presents for induction of labor, found to have elevated pressure of 140/90 and 173/78.  - Admit to L&D  - Routine Labs and HELLP labs. IVF. Covid swab  - EFM: Cat 1, continuous monitoring  - Expectant management / IOL with  - GBS+  - Elevated PPH risk 2/2 AMA, obesity >40, EFW 4100g, aspirin use in pregnancy.  - Anesthesia consult   - D/w Dr. Fabrice Rainey, MS3  Shima Peraza PA-C A/P: 42yoF  @39 weeks gestation (RADHA 23) admitted for eIOL. Pt with labile BPs upon admission 140/90. GBS positive.   - Admit to L&D  - Routine Labs. IVF. Covid neg  - EFM/[place]: continuous monitoring  - Labile BPs -> HELLP labs ordered   - Close BP monitoring   - IOL with Buccal Cytotec & Cervical balloon at midnight   - Ampicillin for GBS ppx  - Elevated PPH risk 2/2 AMA, BMI >40, EFW 4100g, ASA in pregnancy.  - Anesthesia consult - epidural prn   - D/w Dr. Elise Rainey, MS3  Shima Peraza PA-C A/P: 42yoF  @39 weeks gestation (RADHA 23) admitted for eIOL. Pt with labile BPs upon admission 140/90. GBS positive.   - Admit to L&D  - Routine Labs. IVF. Covid neg  - EFM/[place]: continuous monitoring  - Labile BPs -> HELLP labs ordered   - Close BP monitoring   - IOL with Buccal Cytotec & Cervical balloon at midnight   - Ampicillin for GBS ppx  - Elevated PPH risk 2/2 AMA, BMI >40, EFW 4000g, ASA in pregnancy  - Anesthesia consult - epidural prn   - D/w Dr. Elise Peraza, PA-C  Fabrice Rainey, MS3

## 2023-04-19 NOTE — CHART NOTE - NSCHARTNOTEFT_GEN_A_CORE
Discussed diagnosis of preeclampsia without severe feature at this time given no complaints of vision changes, HA, CP, SOB and epigastric/RUQ pain.  Will trend BP due to a single elevated severe BP at 160/70 at time, 15m repeat 146/81. Will call for epidural and CB.  HELLP labs wnl. P/C 0.3 (see below)               12.9   6.50  )-----------( 195      ( 04-19 @ 19:50 )             38.4     04-19 @ 19:50    140  |  106  |  14  ----------------------------<  96  3.8   |  23  |  0.62    Ca    9.9      04-19 @ 19:50    TPro  6.0  /  Alb  3.4  /  TBili  0.3  /  DBili  x   /  AST  20  /  ALT  14  /  AlkPhos  109  04-19 @ 19:50    PT/INR - ( 04-19 @ 19:50 )   PT: 10.1 sec;   INR: 0.87 ratio    PTT - ( 04-19 @ 19:50 )  PTT:24.2 sec    Uric Acid: (04-19 @ 19:50)  5.6      Fibrinogen: (04-19 @ 19:50)  --       LDH: (04-19 @ 19:50)  135        Amyeo Afroz Jereen, PGY-2  d/w Dr Denny Discussed diagnosis of preeclampsia without severe feature at this time given no complaints of vision changes, HA, CP, SOB and epigastric/RUQ pain.  Will trend BP due to a single elevated severe BP at 160/70 at time, 15m repeat 146/81. Will call for epidural and CB.  HELLP labs wnl. P/C 0.3 (see below)    UPDATE:   Patient meet criteria due to multiple severe range BPs within the hours, started on Magnesium and given Procardia 10IR for immediate BP control, started on Procardia 30XL for maintenance BP control.  Patient agreeable to antihypertensives as well as magnesium.               12.9   6.50  )-----------( 195      ( 04-19 @ 19:50 )             38.4     04-19 @ 19:50    140  |  106  |  14  ----------------------------<  96  3.8   |  23  |  0.62    Ca    9.9      04-19 @ 19:50    TPro  6.0  /  Alb  3.4  /  TBili  0.3  /  DBili  x   /  AST  20  /  ALT  14  /  AlkPhos  109  04-19 @ 19:50    PT/INR - ( 04-19 @ 19:50 )   PT: 10.1 sec;   INR: 0.87 ratio    PTT - ( 04-19 @ 19:50 )  PTT:24.2 sec    Uric Acid: (04-19 @ 19:50)  5.6      Fibrinogen: (04-19 @ 19:50)  --       LDH: (04-19 @ 19:50)  135        Amyeo Afroz Jereen, PGY-2  d/w Dr Denny Cheek Interpolation Flap Text: A decision was made to reconstruct the defect utilizing an interpolation axial flap and a staged reconstruction.  A telfa template was made of the defect.  This telfa template was then used to outline the Cheek Interpolation flap.  The donor area for the pedicle flap was then injected with anesthesia.  The flap was excised through the skin and subcutaneous tissue down to the layer of the underlying musculature.  The interpolation flap was carefully excised within this deep plane to maintain its blood supply.  The edges of the donor site were undermined.   The donor site was closed in a primary fashion.  The pedicle was then rotated into position and sutured.  Once the tube was sutured into place, adequate blood supply was confirmed with blanching and refill.  The pedicle was then wrapped with xeroform gauze and dressed appropriately with a telfa and gauze bandage to ensure continued blood supply and protect the attached pedicle.

## 2023-04-19 NOTE — OB PROVIDER H&P - ALERT: PERTINENT HISTORY
1st Trimester Sonogram/20 Week Level II Sonogram/Non Invasive Prenatal Screen (NIPS)/Ultra Screen at 12 Weeks 1st Trimester Sonogram/20 Week Level II Sonogram/BioPhysical Profile(s)/Non Invasive Prenatal Screen (NIPS)/Fetal Non-Stress Test (NST)/Ultra Screen at 12 Weeks

## 2023-04-19 NOTE — OB PROVIDER IHI INDUCTION/AUGMENTATION NOTE - NS_OBIHIREPANPSATTEST_OBGYN_ALL_OB
I have discussed with the Attending the patient's status, as well as the H&P and the fetal status. The Attending agreed with the suggested management. Sarecycline Pregnancy And Lactation Text: This medication is Pregnancy Category D and not consider safe during pregnancy. It is also excreted in breast milk.

## 2023-04-19 NOTE — OB PROVIDER H&P - HISTORY OF PRESENT ILLNESS
OB MS3/PA Admission H&P    42yoF  presents for induction of labor. +FM. -LOF. -CTXs. -VB. Had one elevated blood pressure measurement 3 weeks ago. No headache, vision changes, abdominal pain, or urinary changes.  Pt denies any other concerns.    – PNC: GBS+.  EFW 4100g.   – OBHx: Patient had a D&E for a previous pregnancy.  – GynHx: denies h/o fibroids, ovarian cysts, abnl pap smears, STIs  – PMH: denies h/o HTN, DM, asthma, thyroid disorders, bleeding disorders, h/o blood transfusions   – PSH: ACL repair, D&E, egg retrieval  – Psych: denies anxiety/depression   – Social: denies alcohol/tobacco/drug use in pregnancy   – Meds: PNV   – Allergies: NKDA  – Will accept blood transfusions: Yes    Vital signs_    Gen: NAD  CV: RRR  Lungs: CTA b/l   Abd: gravid, non-tender  Ext: BLE non-edematous, no calf tenderness b/l     – Spec: pooling, nitrazine, ferning, bleeding,  (lesions if patient with HSV2 history)  – VE: //  – FHT: baseline 1, mod variability, +accels, -decels  – [place]: qmin (occasional/irregular/infrequent)  – EFW: _g   – Sono: vertex        OB MS3/PA Admission H&P    42yoF  @39 weeks gestation (RADHA 23) presents for scheduled elective induction of labor. PNC uncomplicated, however, upon arrival to triage patient with elevated /90. Denies headaches, visual changes, epigastric pain. GBS positive. Endorses +FM. Denies -LOF. -CTXs. -VB. Pt denies any other concerns.    – PNC: labile BPs. GBS+. EFW 4100g.   – OBHx: () MAB s/p D&E @ _weeks                (20--)   – GynHx: h/o PCOS, h/o HSV (last outbreak _?); denies h/o fibroids, ovarian cysts, abnl pap smears   – PMH: h/o hypothyroidism; denies h/o HTN, DM, asthma, bleeding disorders, h/o blood transfusions   – PSH: (--) R/L? ACL repair, () D&E, h/o egg retrieval  – Psych: denies anxiety/depression   – Social: denies alcohol/tobacco/drug use in pregnancy   – Meds: PNV, Synthroid , Valtrex 500mg BID, ASA?  – Allergies: NKDA  – Will accept blood transfusions: Yes    Vital Signs Last 24 Hrs  T(C): 37.1 (23 @ 18:32), Max: 37.1 (23 @ 18:20)  T(F): 98.8 (23 @ 18:32), Max: 98.8 (23 @ 18:32)  HR: 107 (23 @ 19:13) (97 - 150)  BP: 126/74 (23 @ 19:11) (126/74 - 173/78)  RR: 16 (23 @ 18:32) (16 - 16)  SpO2: 96% (23 @ 19:13) (92% - 100%)    Gen: NAD  CV: RRR  Lungs: CTA b/l   Abd: gravid, non-tender  Ext: BLE non-edematous, no calf tenderness b/l     – Spec:   (lesions if patient with HSV2 history)  – VE: 0.5/0/-3  – FHT: baseline 135, mod variability, +accels, -decels  – [place]: irritability    – EFW: 4100g   – Sono: vertex        OB PA Admission H&P    42yoF  @39 weeks gestation (RADHA 23) presents for scheduled elective induction of labor. PNC uncomplicated, however, upon arrival to triage patient with elevated /90. Denies headaches, visual changes, epigastric pain. GBS positive. Endorses +FM. Denies -LOF. -CTXs. -VB. Pt denies any other concerns.    – PNC: labile BPs. GBS+. EFW 4100g.   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, no labial lesions hx); denies h/o fibroids, ovarian cysts, abnl pap smears   – PMH: h/o hypothyroidism; denies h/o HTN, DM, asthma, bleeding disorders, h/o blood transfusions   – PSH: () Left ACL repair, () D&E, h/o egg retrieval  – Psych: denies anxiety/depression   – Social: denies alcohol/tobacco/drug use in pregnancy   – Meds: PNV, Synthroid 100mg, Valtrex 500mg BID, ASA, Colace   – Allergies: NKDA  – Will accept blood transfusions: Yes    Vital Signs Last 24 Hrs  T(C): 37.1 (23 @ 18:32), Max: 37.1 (23 @ 18:20)  T(F): 98.8 (23 @ 18:32), Max: 98.8 (23 @ 18:32)  HR: 107 (23 @ 19:13) (97 - 150)  BP: 126/74 (23 @ 19:11) (126/74 - 173/78)  RR: 16 (23 @ 18:32) (16 - 16)  SpO2: 96% (23 @ 19:13) (92% - 100%)    Gen: NAD  CV: RRR  Lungs: CTA b/l   Abd: gravid, non-tender  Ext: BLE non-edematous, no calf tenderness b/l     – Spec: neg for lesions   – VE: 0.5/0/-3  – FHT: baseline 135, mod variability, +accels, -decels  – [place]: irritability    – EFW: 4000g   – Sono: vertex

## 2023-04-19 NOTE — OB PROVIDER H&P - NSHPPHYSICALEXAM_GEN_ALL_CORE
Patient here with mom   FEEDING:  Milk - whole                       Diet - good variety of foods  SLEEPIN hours at night  URINATION:  no enuresis or daytime incontinence    STOOLS:  normal  SCHOOL HISTORY:       School - homeschooled       Grade - 2       Academic performance - normal       Extracurricular Activities - home school co-op  CONCERNS:  Cough for 10 days    Health Maintenance Due   Topic Date Due   • Influenza Vaccine (1) 2018       Patient is due for topics as listed above but is not proceeding with Immunization(s)  at this time.     Patient denies allergy or sensitivity to LATEX.  Medications reviewed and updated as necessary.  Tobacco history reviewed.     see above

## 2023-04-19 NOTE — OB PROVIDER H&P - NS ATTEND AMEND GEN_ALL_CORE FT
P0 admitted for term induction age 42   fetal status reassuring  cat 1  consents obtained  cont toco and efm  GBS positive - amp  epidural prn  buccal/CF/pitocin    Adelita Olivares MD

## 2023-04-20 ENCOUNTER — TRANSCRIPTION ENCOUNTER (OUTPATIENT)
Age: 43
End: 2023-04-20

## 2023-04-20 LAB
ALBUMIN SERPL ELPH-MCNC: 3.1 G/DL — LOW (ref 3.3–5)
ALP SERPL-CCNC: 112 U/L — SIGNIFICANT CHANGE UP (ref 40–120)
ALT FLD-CCNC: 12 U/L — SIGNIFICANT CHANGE UP (ref 10–45)
ANION GAP SERPL CALC-SCNC: 10 MMOL/L — SIGNIFICANT CHANGE UP (ref 5–17)
APTT BLD: 23.2 SEC — LOW (ref 27.5–35.5)
AST SERPL-CCNC: 19 U/L — SIGNIFICANT CHANGE UP (ref 10–40)
BASOPHILS # BLD AUTO: 0.01 K/UL — SIGNIFICANT CHANGE UP (ref 0–0.2)
BASOPHILS NFR BLD AUTO: 0.1 % — SIGNIFICANT CHANGE UP (ref 0–2)
BILIRUB SERPL-MCNC: 0.4 MG/DL — SIGNIFICANT CHANGE UP (ref 0.2–1.2)
BUN SERPL-MCNC: 9 MG/DL — SIGNIFICANT CHANGE UP (ref 7–23)
CALCIUM SERPL-MCNC: 7.9 MG/DL — LOW (ref 8.4–10.5)
CHLORIDE SERPL-SCNC: 103 MMOL/L — SIGNIFICANT CHANGE UP (ref 96–108)
CO2 SERPL-SCNC: 22 MMOL/L — SIGNIFICANT CHANGE UP (ref 22–31)
COVID-19 SPIKE DOMAIN AB INTERP: POSITIVE
COVID-19 SPIKE DOMAIN ANTIBODY RESULT: >250 U/ML — HIGH
CREAT SERPL-MCNC: 0.65 MG/DL — SIGNIFICANT CHANGE UP (ref 0.5–1.3)
EGFR: 114 ML/MIN/1.73M2 — SIGNIFICANT CHANGE UP
EOSINOPHIL # BLD AUTO: 0.01 K/UL — SIGNIFICANT CHANGE UP (ref 0–0.5)
EOSINOPHIL NFR BLD AUTO: 0.1 % — SIGNIFICANT CHANGE UP (ref 0–6)
FIBRINOGEN PPP-MCNC: 518 MG/DL — HIGH (ref 200–445)
GLUCOSE SERPL-MCNC: 83 MG/DL — SIGNIFICANT CHANGE UP (ref 70–99)
HCT VFR BLD CALC: 37.9 % — SIGNIFICANT CHANGE UP (ref 34.5–45)
HGB BLD-MCNC: 12.9 G/DL — SIGNIFICANT CHANGE UP (ref 11.5–15.5)
IMM GRANULOCYTES NFR BLD AUTO: 0.3 % — SIGNIFICANT CHANGE UP (ref 0–0.9)
INR BLD: 0.9 RATIO — SIGNIFICANT CHANGE UP (ref 0.88–1.16)
LDH SERPL L TO P-CCNC: 144 U/L — SIGNIFICANT CHANGE UP (ref 50–242)
LYMPHOCYTES # BLD AUTO: 1.16 K/UL — SIGNIFICANT CHANGE UP (ref 1–3.3)
LYMPHOCYTES # BLD AUTO: 12.8 % — LOW (ref 13–44)
MAGNESIUM SERPL-MCNC: 4.3 MG/DL — HIGH (ref 1.6–2.6)
MAGNESIUM SERPL-MCNC: 5.1 MG/DL — HIGH (ref 1.6–2.6)
MAGNESIUM SERPL-MCNC: 5.4 MG/DL — HIGH (ref 1.6–2.6)
MCHC RBC-ENTMCNC: 30.4 PG — SIGNIFICANT CHANGE UP (ref 27–34)
MCHC RBC-ENTMCNC: 34 GM/DL — SIGNIFICANT CHANGE UP (ref 32–36)
MCV RBC AUTO: 89.4 FL — SIGNIFICANT CHANGE UP (ref 80–100)
MONOCYTES # BLD AUTO: 0.5 K/UL — SIGNIFICANT CHANGE UP (ref 0–0.9)
MONOCYTES NFR BLD AUTO: 5.5 % — SIGNIFICANT CHANGE UP (ref 2–14)
NEUTROPHILS # BLD AUTO: 7.38 K/UL — SIGNIFICANT CHANGE UP (ref 1.8–7.4)
NEUTROPHILS NFR BLD AUTO: 81.2 % — HIGH (ref 43–77)
NRBC # BLD: 0 /100 WBCS — SIGNIFICANT CHANGE UP (ref 0–0)
PLATELET # BLD AUTO: 194 K/UL — SIGNIFICANT CHANGE UP (ref 150–400)
POTASSIUM SERPL-MCNC: 4.1 MMOL/L — SIGNIFICANT CHANGE UP (ref 3.5–5.3)
POTASSIUM SERPL-SCNC: 4.1 MMOL/L — SIGNIFICANT CHANGE UP (ref 3.5–5.3)
PROT ?TM UR-MCNC: 31 MG/DL — HIGH (ref 0–12)
PROT SERPL-MCNC: 5.9 G/DL — LOW (ref 6–8.3)
PROTHROM AB SERPL-ACNC: 10.3 SEC — LOW (ref 10.5–13.4)
RBC # BLD: 4.24 M/UL — SIGNIFICANT CHANGE UP (ref 3.8–5.2)
RBC # FLD: 13.8 % — SIGNIFICANT CHANGE UP (ref 10.3–14.5)
SARS-COV-2 IGG+IGM SERPL QL IA: >250 U/ML — HIGH
SARS-COV-2 IGG+IGM SERPL QL IA: POSITIVE
SODIUM SERPL-SCNC: 135 MMOL/L — SIGNIFICANT CHANGE UP (ref 135–145)
T PALLIDUM AB TITR SER: NEGATIVE — SIGNIFICANT CHANGE UP
URATE SERPL-MCNC: 5.8 MG/DL — SIGNIFICANT CHANGE UP (ref 2.5–7)
WBC # BLD: 9.09 K/UL — SIGNIFICANT CHANGE UP (ref 3.8–10.5)
WBC # FLD AUTO: 9.09 K/UL — SIGNIFICANT CHANGE UP (ref 3.8–10.5)

## 2023-04-20 RX ORDER — NIFEDIPINE 30 MG
30 TABLET, EXTENDED RELEASE 24 HR ORAL EVERY 24 HOURS
Refills: 0 | Status: DISCONTINUED | OUTPATIENT
Start: 2023-04-20 | End: 2023-04-23

## 2023-04-20 RX ORDER — MAGNESIUM SULFATE 500 MG/ML
4 VIAL (ML) INJECTION ONCE
Refills: 0 | Status: COMPLETED | OUTPATIENT
Start: 2023-04-20 | End: 2023-04-20

## 2023-04-20 RX ORDER — ACETAMINOPHEN 500 MG
975 TABLET ORAL ONCE
Refills: 0 | Status: COMPLETED | OUTPATIENT
Start: 2023-04-20 | End: 2023-04-20

## 2023-04-20 RX ORDER — OXYTOCIN 10 UNIT/ML
4 VIAL (ML) INJECTION
Qty: 30 | Refills: 0 | Status: DISCONTINUED | OUTPATIENT
Start: 2023-04-20 | End: 2023-04-21

## 2023-04-20 RX ORDER — NIFEDIPINE 30 MG
10 TABLET, EXTENDED RELEASE 24 HR ORAL ONCE
Refills: 0 | Status: COMPLETED | OUTPATIENT
Start: 2023-04-20 | End: 2023-04-20

## 2023-04-20 RX ORDER — METOCLOPRAMIDE HCL 10 MG
10 TABLET ORAL ONCE
Refills: 0 | Status: COMPLETED | OUTPATIENT
Start: 2023-04-20 | End: 2023-04-20

## 2023-04-20 RX ORDER — LEVOTHYROXINE SODIUM 125 MCG
100 TABLET ORAL DAILY
Refills: 0 | Status: DISCONTINUED | OUTPATIENT
Start: 2023-04-20 | End: 2023-04-23

## 2023-04-20 RX ORDER — MAGNESIUM SULFATE 500 MG/ML
2 VIAL (ML) INJECTION
Qty: 40 | Refills: 0 | Status: DISCONTINUED | OUTPATIENT
Start: 2023-04-20 | End: 2023-04-23

## 2023-04-20 RX ADMIN — Medication 108 GRAM(S): at 12:55

## 2023-04-20 RX ADMIN — Medication 108 GRAM(S): at 21:29

## 2023-04-20 RX ADMIN — Medication 108 GRAM(S): at 00:18

## 2023-04-20 RX ADMIN — Medication 4 MILLIUNIT(S)/MIN: at 12:25

## 2023-04-20 RX ADMIN — Medication 108 GRAM(S): at 04:35

## 2023-04-20 RX ADMIN — Medication 108 GRAM(S): at 08:42

## 2023-04-20 RX ADMIN — Medication 108 GRAM(S): at 17:17

## 2023-04-20 RX ADMIN — Medication 975 MILLIGRAM(S): at 17:43

## 2023-04-20 RX ADMIN — Medication 300 GRAM(S): at 01:20

## 2023-04-20 RX ADMIN — Medication 10 MILLIGRAM(S): at 12:11

## 2023-04-20 RX ADMIN — Medication 10 MILLIGRAM(S): at 00:38

## 2023-04-20 RX ADMIN — Medication 30 MILLIGRAM(S): at 00:39

## 2023-04-20 RX ADMIN — Medication 100 MICROGRAM(S): at 10:50

## 2023-04-20 RX ADMIN — Medication 50 GM/HR: at 01:46

## 2023-04-20 RX ADMIN — Medication 50 GM/HR: at 20:26

## 2023-04-20 RX ADMIN — Medication 975 MILLIGRAM(S): at 09:31

## 2023-04-20 RX ADMIN — Medication 975 MILLIGRAM(S): at 10:10

## 2023-04-20 RX ADMIN — Medication 10 MILLIGRAM(S): at 20:19

## 2023-04-20 NOTE — OB PROVIDER LABOR PROGRESS NOTE - NS_SUBJECTIVE/OBJECTIVE_OBGYN_ALL_OB_FT
Pt resting comfortably in bed. Balloon still in situ
Patient reports feeling tired with mild frontal headaches. Denies chest pain, shortness of breath, nausea/vomiting or visual disturbances.   + FM   Cervical balloon in place and buccal cytotec s/p 3 doses at 2:30a
Pt due for CB s/p epidural.
Pt resting comfortably in bed. C/o slight HA. Had tylenol with slight relief. Balloon in vagina and removed.
Pt seen at bedside for amniotomy. Head too high to AROM safely. pitocin turned up to 20mU
Pt resting comfortably. HA improved with reglan. Evaluated for amniotomy. Pitocin at 8mU and increased to 12 mU upon my arrival.
Patient reassessed. Reports feeling some pressure. Pitocin at 20mu/min.

## 2023-04-20 NOTE — OB PROVIDER LABOR PROGRESS NOTE - ASSESSMENT
IOL - severe pre-eclampsia   -continue EFM and toco   -magnesium sulfate therapy   -continue procardia XL   -continue to monitor vitals and urine output   -continue buccal cytotec for induction     A Eduin 
IOL at 39+ wks with severe pre-eclampsia on magnesium sulfate therapy   -patient declines continued trial of labor, reports she wants to have a CD at this time   recommended ROM and continued trial because this can take time with an induction given she is on pitocin and magnesium. Patient and partner expressed understanding but declined further induction.   Risks/benefits/alternatives discussed. Consents confirmed and questions answered.   -peds and anesthesia informed.     DANIA Denny 
Reviewed head too high for amniotomy. Will continue with increasing pitocin. Peanut placed. Pt on VIK Russo DO
Will switch to pitocin. Place peanut birthing ball. Head too high to AROM. Will allow to descend.     Trinidad Russo, DO
Pt repositioned in semi fowlers. Pitocin turned up to 20mU on my arrival. Long conversation ab labor progress. Reviewed suspected LGA. Will try this position to help bring head down. IUPC to be placed with amniotomy hopefully on next exam. Reviewed if no cervical change for extended period of time have to have concern that the baby may be too big and thus unable to descend into pelvis. Will monitor closely. Pt verb understanding.     Trinidad Russo, DO
Reviewed IOL with pt and plan. Explained suspected LGA. Risk macrosomia. Reviewed labor progression does not predict outcome however if labor dystocia will be suspicious of LGA. Pt verb understanding. Will continue with cytotec until balloon removed and then switch to pitoin.    Trinidad Russo, DO
pt tolerated well.      - CB placed w/out complaints with epi for pain control  - Fresh red bleeding noted on exam prior to placement of CB, will continue to monitor.  - c/w CB    Amyeo Afroz Jereen, PGY-2  dw Dr Denny

## 2023-04-20 NOTE — OB PROVIDER LABOR PROGRESS NOTE - NSVAGINALEXAM_OBGYN_ALL_OB_DT
----- Message from Micheline Chapa RN sent at 10/21/2019 11:50 AM CDT -----  Regarding: FW: New Patient - Referred by Dr. Jara (spelling is wrong)    ----- Message -----  From: Deneen Mccann  Sent: 10/21/2019  11:31 AM CDT  To: Micheline Chapa RN  Subject: FW: New Patient - Referred by Dr. Jara (s#    Belkis Bainsi,    This is a new pt that Dr. Paniagua is seeing. Dr. Paniagua wants the pt to complete a 6MWT and consult labs prior to the appt, which I have scheduled. Could you please place the order?    Thank you,    -Deneen GARCIA  ----- Message -----  From: Deneen Mccann  Sent: 10/18/2019   2:45 PM CDT  To: Deneen Mccann  Subject: New Patient - Referred by Dr. Jara (spell#    New pt to see TT. Please schedule appointment.        
20-Apr-2023 15:41
20-Apr-2023 08:30
20-Apr-2023 22:05
20-Apr-2023 01:18
20-Apr-2023 11:35
20-Apr-2023 18:15

## 2023-04-20 NOTE — OB PROVIDER LABOR PROGRESS NOTE - NS_OBIHICONTRACTIONPATTERNDETAILS_OBGYN_ALL_OB_FT
1-2 contractions q 10 minutes
q2-6
q 2-5
q 2-5 prior to pitocin increase
1-2 contractions q 10 minutes
q2-4
irrg

## 2023-04-21 ENCOUNTER — TRANSCRIPTION ENCOUNTER (OUTPATIENT)
Age: 43
End: 2023-04-21

## 2023-04-21 LAB
ALBUMIN SERPL ELPH-MCNC: 3.1 G/DL — LOW (ref 3.3–5)
ALP SERPL-CCNC: 111 U/L — SIGNIFICANT CHANGE UP (ref 40–120)
ALT FLD-CCNC: 12 U/L — SIGNIFICANT CHANGE UP (ref 10–45)
ANION GAP SERPL CALC-SCNC: 10 MMOL/L — SIGNIFICANT CHANGE UP (ref 5–17)
APTT BLD: 23.3 SEC — LOW (ref 27.5–35.5)
AST SERPL-CCNC: 18 U/L — SIGNIFICANT CHANGE UP (ref 10–40)
BASOPHILS # BLD AUTO: 0.02 K/UL — SIGNIFICANT CHANGE UP (ref 0–0.2)
BASOPHILS NFR BLD AUTO: 0.2 % — SIGNIFICANT CHANGE UP (ref 0–2)
BILIRUB SERPL-MCNC: 0.3 MG/DL — SIGNIFICANT CHANGE UP (ref 0.2–1.2)
BUN SERPL-MCNC: 8 MG/DL — SIGNIFICANT CHANGE UP (ref 7–23)
CALCIUM SERPL-MCNC: 7.6 MG/DL — LOW (ref 8.4–10.5)
CHLORIDE SERPL-SCNC: 103 MMOL/L — SIGNIFICANT CHANGE UP (ref 96–108)
CO2 SERPL-SCNC: 21 MMOL/L — LOW (ref 22–31)
CREAT SERPL-MCNC: 0.65 MG/DL — SIGNIFICANT CHANGE UP (ref 0.5–1.3)
EGFR: 114 ML/MIN/1.73M2 — SIGNIFICANT CHANGE UP
EOSINOPHIL # BLD AUTO: 0.01 K/UL — SIGNIFICANT CHANGE UP (ref 0–0.5)
EOSINOPHIL NFR BLD AUTO: 0.1 % — SIGNIFICANT CHANGE UP (ref 0–6)
FIBRINOGEN PPP-MCNC: 556 MG/DL — HIGH (ref 200–445)
GLUCOSE SERPL-MCNC: 106 MG/DL — HIGH (ref 70–99)
HCT VFR BLD CALC: 37.9 % — SIGNIFICANT CHANGE UP (ref 34.5–45)
HGB BLD-MCNC: 12.7 G/DL — SIGNIFICANT CHANGE UP (ref 11.5–15.5)
IMM GRANULOCYTES NFR BLD AUTO: 0.4 % — SIGNIFICANT CHANGE UP (ref 0–0.9)
INR BLD: 0.91 RATIO — SIGNIFICANT CHANGE UP (ref 0.88–1.16)
LDH SERPL L TO P-CCNC: 170 U/L — SIGNIFICANT CHANGE UP (ref 50–242)
LYMPHOCYTES # BLD AUTO: 0.42 K/UL — LOW (ref 1–3.3)
LYMPHOCYTES # BLD AUTO: 3.5 % — LOW (ref 13–44)
MAGNESIUM SERPL-MCNC: 5.3 MG/DL — HIGH (ref 1.6–2.6)
MAGNESIUM SERPL-MCNC: 5.7 MG/DL — HIGH (ref 1.6–2.6)
MAGNESIUM SERPL-MCNC: 6 MG/DL — HIGH (ref 1.6–2.6)
MAGNESIUM SERPL-MCNC: 6.2 MG/DL — HIGH (ref 1.6–2.6)
MCHC RBC-ENTMCNC: 30.3 PG — SIGNIFICANT CHANGE UP (ref 27–34)
MCHC RBC-ENTMCNC: 33.5 GM/DL — SIGNIFICANT CHANGE UP (ref 32–36)
MCV RBC AUTO: 90.5 FL — SIGNIFICANT CHANGE UP (ref 80–100)
MONOCYTES # BLD AUTO: 0.22 K/UL — SIGNIFICANT CHANGE UP (ref 0–0.9)
MONOCYTES NFR BLD AUTO: 1.8 % — LOW (ref 2–14)
NEUTROPHILS # BLD AUTO: 11.34 K/UL — HIGH (ref 1.8–7.4)
NEUTROPHILS NFR BLD AUTO: 94 % — HIGH (ref 43–77)
NRBC # BLD: 0 /100 WBCS — SIGNIFICANT CHANGE UP (ref 0–0)
PLATELET # BLD AUTO: 195 K/UL — SIGNIFICANT CHANGE UP (ref 150–400)
POTASSIUM SERPL-MCNC: 4 MMOL/L — SIGNIFICANT CHANGE UP (ref 3.5–5.3)
POTASSIUM SERPL-SCNC: 4 MMOL/L — SIGNIFICANT CHANGE UP (ref 3.5–5.3)
PROT SERPL-MCNC: 5.9 G/DL — LOW (ref 6–8.3)
PROTHROM AB SERPL-ACNC: 10.4 SEC — LOW (ref 10.5–13.4)
RBC # BLD: 4.19 M/UL — SIGNIFICANT CHANGE UP (ref 3.8–5.2)
RBC # FLD: 13.9 % — SIGNIFICANT CHANGE UP (ref 10.3–14.5)
SODIUM SERPL-SCNC: 134 MMOL/L — LOW (ref 135–145)
URATE SERPL-MCNC: 6.2 MG/DL — SIGNIFICANT CHANGE UP (ref 2.5–7)
WBC # BLD: 12.06 K/UL — HIGH (ref 3.8–10.5)
WBC # FLD AUTO: 12.06 K/UL — HIGH (ref 3.8–10.5)

## 2023-04-21 PROCEDURE — 88307 TISSUE EXAM BY PATHOLOGIST: CPT | Mod: 26

## 2023-04-21 RX ORDER — DIPHENHYDRAMINE HCL 50 MG
25 CAPSULE ORAL EVERY 4 HOURS
Refills: 0 | Status: DISCONTINUED | OUTPATIENT
Start: 2023-04-21 | End: 2023-04-21

## 2023-04-21 RX ORDER — OXYCODONE HYDROCHLORIDE 5 MG/1
5 TABLET ORAL ONCE
Refills: 0 | Status: DISCONTINUED | OUTPATIENT
Start: 2023-04-21 | End: 2023-04-23

## 2023-04-21 RX ORDER — IBUPROFEN 200 MG
600 TABLET ORAL EVERY 6 HOURS
Refills: 0 | Status: COMPLETED | OUTPATIENT
Start: 2023-04-21 | End: 2024-03-19

## 2023-04-21 RX ORDER — OXYCODONE HYDROCHLORIDE 5 MG/1
5 TABLET ORAL
Refills: 0 | Status: DISCONTINUED | OUTPATIENT
Start: 2023-04-21 | End: 2023-04-23

## 2023-04-21 RX ORDER — OXYCODONE HYDROCHLORIDE 5 MG/1
5 TABLET ORAL
Refills: 0 | Status: DISCONTINUED | OUTPATIENT
Start: 2023-04-21 | End: 2023-04-21

## 2023-04-21 RX ORDER — LANOLIN
1 OINTMENT (GRAM) TOPICAL EVERY 6 HOURS
Refills: 0 | Status: DISCONTINUED | OUTPATIENT
Start: 2023-04-21 | End: 2023-04-23

## 2023-04-21 RX ORDER — NALOXONE HYDROCHLORIDE 4 MG/.1ML
0.1 SPRAY NASAL
Refills: 0 | Status: DISCONTINUED | OUTPATIENT
Start: 2023-04-21 | End: 2023-04-21

## 2023-04-21 RX ORDER — HYDROXYZINE HCL 10 MG
25 TABLET ORAL ONCE
Refills: 0 | Status: COMPLETED | OUTPATIENT
Start: 2023-04-21 | End: 2023-04-21

## 2023-04-21 RX ORDER — OXYTOCIN 10 UNIT/ML
333.33 VIAL (ML) INJECTION
Qty: 20 | Refills: 0 | Status: DISCONTINUED | OUTPATIENT
Start: 2023-04-21 | End: 2023-04-23

## 2023-04-21 RX ORDER — KETOROLAC TROMETHAMINE 30 MG/ML
30 SYRINGE (ML) INJECTION EVERY 6 HOURS
Refills: 0 | Status: DISCONTINUED | OUTPATIENT
Start: 2023-04-21 | End: 2023-04-23

## 2023-04-21 RX ORDER — NALBUPHINE HYDROCHLORIDE 10 MG/ML
2.5 INJECTION, SOLUTION INTRAMUSCULAR; INTRAVENOUS; SUBCUTANEOUS EVERY 6 HOURS
Refills: 0 | Status: DISCONTINUED | OUTPATIENT
Start: 2023-04-21 | End: 2023-04-21

## 2023-04-21 RX ORDER — DEXAMETHASONE 0.5 MG/5ML
4 ELIXIR ORAL EVERY 6 HOURS
Refills: 0 | Status: DISCONTINUED | OUTPATIENT
Start: 2023-04-21 | End: 2023-04-21

## 2023-04-21 RX ORDER — SIMETHICONE 80 MG/1
80 TABLET, CHEWABLE ORAL EVERY 4 HOURS
Refills: 0 | Status: DISCONTINUED | OUTPATIENT
Start: 2023-04-21 | End: 2023-04-23

## 2023-04-21 RX ORDER — ACETAMINOPHEN 500 MG
975 TABLET ORAL
Refills: 0 | Status: DISCONTINUED | OUTPATIENT
Start: 2023-04-21 | End: 2023-04-23

## 2023-04-21 RX ORDER — DIPHENHYDRAMINE HCL 50 MG
25 CAPSULE ORAL EVERY 6 HOURS
Refills: 0 | Status: DISCONTINUED | OUTPATIENT
Start: 2023-04-21 | End: 2023-04-23

## 2023-04-21 RX ORDER — MORPHINE SULFATE 50 MG/1
2 CAPSULE, EXTENDED RELEASE ORAL ONCE
Refills: 0 | Status: DISCONTINUED | OUTPATIENT
Start: 2023-04-21 | End: 2023-04-21

## 2023-04-21 RX ORDER — HEPARIN SODIUM 5000 [USP'U]/ML
5000 INJECTION INTRAVENOUS; SUBCUTANEOUS EVERY 12 HOURS
Refills: 0 | Status: DISCONTINUED | OUTPATIENT
Start: 2023-04-21 | End: 2023-04-23

## 2023-04-21 RX ORDER — DIPHENOXYLATE HCL/ATROPINE 2.5-.025MG
2 TABLET ORAL ONCE
Refills: 0 | Status: DISCONTINUED | OUTPATIENT
Start: 2023-04-21 | End: 2023-04-21

## 2023-04-21 RX ORDER — SODIUM CHLORIDE 9 MG/ML
1000 INJECTION, SOLUTION INTRAVENOUS
Refills: 0 | Status: DISCONTINUED | OUTPATIENT
Start: 2023-04-21 | End: 2023-04-23

## 2023-04-21 RX ORDER — ONDANSETRON 8 MG/1
4 TABLET, FILM COATED ORAL EVERY 6 HOURS
Refills: 0 | Status: DISCONTINUED | OUTPATIENT
Start: 2023-04-21 | End: 2023-04-21

## 2023-04-21 RX ORDER — TETANUS TOXOID, REDUCED DIPHTHERIA TOXOID AND ACELLULAR PERTUSSIS VACCINE, ADSORBED 5; 2.5; 8; 8; 2.5 [IU]/.5ML; [IU]/.5ML; UG/.5ML; UG/.5ML; UG/.5ML
0.5 SUSPENSION INTRAMUSCULAR ONCE
Refills: 0 | Status: DISCONTINUED | OUTPATIENT
Start: 2023-04-21 | End: 2023-04-23

## 2023-04-21 RX ORDER — MAGNESIUM HYDROXIDE 400 MG/1
30 TABLET, CHEWABLE ORAL
Refills: 0 | Status: DISCONTINUED | OUTPATIENT
Start: 2023-04-21 | End: 2023-04-23

## 2023-04-21 RX ADMIN — Medication 30 MILLIGRAM(S): at 00:37

## 2023-04-21 RX ADMIN — Medication 975 MILLIGRAM(S): at 09:20

## 2023-04-21 RX ADMIN — Medication 975 MILLIGRAM(S): at 21:40

## 2023-04-21 RX ADMIN — HEPARIN SODIUM 5000 UNIT(S): 5000 INJECTION INTRAVENOUS; SUBCUTANEOUS at 17:44

## 2023-04-21 RX ADMIN — Medication 25 MILLIGRAM(S): at 18:30

## 2023-04-21 RX ADMIN — Medication 975 MILLIGRAM(S): at 15:50

## 2023-04-21 RX ADMIN — Medication 975 MILLIGRAM(S): at 15:15

## 2023-04-21 RX ADMIN — Medication 30 MILLIGRAM(S): at 07:30

## 2023-04-21 RX ADMIN — Medication 975 MILLIGRAM(S): at 05:22

## 2023-04-21 RX ADMIN — Medication 30 MILLIGRAM(S): at 17:57

## 2023-04-21 RX ADMIN — Medication 975 MILLIGRAM(S): at 10:00

## 2023-04-21 RX ADMIN — Medication 30 MILLIGRAM(S): at 08:30

## 2023-04-21 RX ADMIN — Medication 975 MILLIGRAM(S): at 22:01

## 2023-04-21 RX ADMIN — Medication 2 TABLET(S): at 02:50

## 2023-04-21 RX ADMIN — Medication 30 MILLIGRAM(S): at 13:30

## 2023-04-21 RX ADMIN — Medication 25 MILLIGRAM(S): at 12:51

## 2023-04-21 RX ADMIN — Medication 30 MILLIGRAM(S): at 12:51

## 2023-04-21 RX ADMIN — Medication 100 MICROGRAM(S): at 08:16

## 2023-04-21 NOTE — DISCHARGE NOTE OB - PATIENT PORTAL LINK FT
You can access the FollowMyHealth Patient Portal offered by [facility] by registering at the following website: [email or web address]. By joining [facility]’s FollowMyHealth portal, you will also be able to view your health information using other applications (apps) compatible with our system.

## 2023-04-21 NOTE — DISCHARGE NOTE OB - CARE PLAN
1 Principal Discharge DX:	Status post primary low transverse  section  Assessment and plan of treatment:	continue pain control as needed   monitor vaginal bleeding  Secondary Diagnosis:	Severe pre-eclampsia  Assessment and plan of treatment:	continue to monitor blood pressures twice a day at home   continue Procardia daily   RV in one week for blood pressure check   Principal Discharge DX:	Status post primary low transverse  section  Assessment and plan of treatment:	After discharge, please stay on pelvic rest for 6 weeks, meaning no sexual intercourse, no tampons and no douching.  No driving for 2 weeks as women can loose a lot of blood during delivery and there is a possibility of being lightheaded/fainting.  No lifting objects heavier than baby for two weeks.  Expect to have vaginal bleeding/spotting for up to six weeks.  The bleeding should get lighter and more white/light brown with time.  For bleeding soaking more than a pad an hour or passing clots greater than the size of your fist, come in to the emergency department.    Follow up in the office in 2 weeks for incision check.  Call your OBGYN for noticeable increase in redness or swelling at incision, discharge from incision, or opening of skin at incision site.  Secondary Diagnosis:	Severe pre-eclampsia  Assessment and plan of treatment:	Continue to take your blood pressure at least twice a day, and take your medications as prescribed.  Call your OB if your pressures are greater than 150/100, or if you experience severe or persistent headaches, visual changes, persistent nausea/vomiting, trouble breathing, chest pain, or severe abdominal pain. Please follow up with your -OBGYN within 1 week to review your blood pressures.   continue to monitor blood pressures twice a day at home   continue Procardia daily   RV in one week for blood pressure check

## 2023-04-21 NOTE — DISCHARGE NOTE OB - YOU MAY BE A PASSENGER IN A CAR.  BE SURE TO GET OUT OF THE CAR AND STRETCH IF YOU TRAVEL FOR A LONG PERIOD OF TIME
[facility] [place][note 3]  Outpatient Neurological Rehabilitation  Clinical Health Psychology    Initial Psychology Evaluation    3/22/2017     HISTORY OF PRESENT ILLNESS:   The patient is a  64 year old  female who was referred for health psychology services by Dr. Salazar as a component of the outpatient neurological rehabilitation program.     The patient explains that her mother passed away in November 2016 at age of 84 years old. This was a sudden passing. As she and her family members were waiting for the paramedics to arrive after her mother had passed, the patient had a seizure and was taken to the hospital by the parametics instead. The patient endorses symptoms of depression related to thinking about the future. She denies suicidal and homicidal ideation. She is worried that she will not be able to live on her own because of her seizures. The evaluating provider spoke separately with the patient's sister who indicated that the patient was not demonstrating an ability to initiate behavioral goals. For instance, the patient is given goals by her therapists in the outpatient neurological rehabilitation program, but she has not been initiating these exercises unless prompted by one of her sisters. She had been living on her own but since the seizure has been living with her sister and her sister's . At their home, the patient is described as being very rigid and set in her ways. She does not demonstrate an ability to be self-sufficient in regard to entertaining herself. Despite having lived on her own for several years, she frequently states to the others in the house that she is bored. The patient describes several symptoms of anxiety including worry, rigid thinking and excessive picking of the skin on her upper back, which has been occurring for the past 3 years. She states that she picks her skin to the point of bleeding when she becomes anxious. It has become a problem  and she and her family members want her to be able to stop.    The patient has been under the care of outpatient psychiatrist, Dr. 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 The patient has frontal lobe damage with Alzheimer dementia. Her father had frontotemporal dementia and several personality changes and disinhibited behavior after he is was diagnosed. Thus, Dr. Sharp has been monitoring the patient's behavior, with the assistance of her sisters. The patient has demonstrated difficulty with frontal lobe function and excessive emotional lability likely related to the dementia.     According to the patient's outpatient psychiatrist's most recent visit note on 6/29/2016, she is prescribed and taking Aricept 10 mg, mirtazapine 15 mg at bedtime and Zoloft 100 mg. While hospitalized in November and December 2016, the patient was seen by another psychiatrist, Dr. Wakefield, who indicated in her notes that the patient was prescribed and taking the same medications in addition to Depakote 750 mg twice daily.    PSYCHIATRIC HISTORY:   The patient has been under the care of Dr. Ignacia Sharp, psychiatrist, for depression and anxiety for several years. She states that she began seeing Dr. Sharp due to worker's comp issues related to the dementia. She has never participated in psychotherapy. She describes having had panic attacks in the past but no longer experiences these.    AODA HISTORY:   The patient quit smoking in the hospital after having the seizure. She currently uses a vapor cigarette about 4 times per day. She has been a smoker since freshman year in college. The patient has an occasional alcoholic beverage but tries to avoid drinking because alcohol causes migraines. The patient denies the use of drugs.    Past Medical History:   Diagnosis Date   • Anxiety and depression    • Atrial fibrillation 10/15    declines AC / started on Xarelto   • Chronic headaches    • CVA  (cerebral vascular accident)     right temp parietal - incidental finding per CT 7/16   • Frontal lobe dementia     \"runs in the family\"   • HTN (hypertension)    • Legally blind     right eye   • Seizure disorder 11/2016       Current Outpatient Prescriptions   Medication Sig Dispense Refill   • cloNIDine (CATAPRES) 0.1 MG tablet Take 1 tablet by mouth 2 times daily. 60 tablet 0   • divalproex (DEPAKOTE ER) 500 MG 24 hr ER tablet Take 500 mg by mouth 2 times daily.     • metoprolol (TOPROL-XL) 200 MG 24 hr tablet Take 1 tablet by mouth daily. 90 tablet 1   • atorvastatin (LIPITOR) 20 MG tablet Take 1 tablet by mouth nightly. 30 tablet 3   • valsartan (DIOVAN) 320 MG tablet Take 1 tablet by mouth daily. 30 tablet 3   • doxazosin (CARDURA) 8 MG tablet Take 1 tablet by mouth nightly. 30 tablet 3   • triamcinolone (ARISTOCORT) 0.1 % cream Apply topically 2 times daily. 30 g 3   • aspirin 81 MG chewable tablet Chew 1 tablet by mouth daily.     • diltiazem (TIAZAC) 240 MG 24 hr capsule Take 1 capsule by mouth daily.     • donepezil (ARICEPT) 10 MG tablet Take 10 mg by mouth nightly. Dose: 1 tablet (=10 mg)     • mirtazapine (REMERON) 15 MG tablet Take 15 mg by mouth nightly. Dose: 1 tablet (=15 mg)     • rivaroxaban (XARELTO) 20 MG Tab Take 20 mg by mouth daily (with dinner). Dose: 1 tablet (=20 mg)     • sertraline (ZOLOFT) 100 MG tablet Take 100 mg by mouth daily. Dose: 1 tablet (=100 mg)       No current facility-administered medications for this encounter.        SOCIAL HISTORY:   The patient was raised in the [place], [place] area. She has 2 younger sisters. She was  but is currently . She has never been able to have children. She lived independently until her seizure in November 2016. Since then, she has been living with her sister, staying in her mother's former bedroom. Within the next year, the sister that patient lives with will be relocating to [place], [place], and the other sister  will be relocating out of state. Their hope for the patient is that she will be to find an assisted living facility.    The patient attended college at the [employer] and obtained a nursing degree. 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 She states that she ultimately retired related to forgetfulness related to that event and significant vision problems. She went through I procedures to improve her vision but she still has problems, thus she has not been able to drive for years. The patient's sisters give her rides to appointments, which is something that has become quite overwhelming for them. Allegedly, the patient and her brother-in-law do not get along, thus he is not willing to help out.    MENTAL STATUS:   The patient was quite pleasant and cooperative during this assessment interview. She appeared her stated age. Her appearance and hygiene were unremarkable. Her posture and gait were normal. She had limited eye contact, likely related to her vision problems.     The patient's mood was depressed and anxious. Affect was constricted. Thought content and processes were appropriate. Her memory problems were evident, especially when attempting to recall dates. The patient's insight into her need for behavioral health services was good. Her judgment appears to be poor, based on outside report of difficulty with initiation, problem solving and memory, most likely related to frontotemporal type dementia. She was oriented to person, place and time. Her speech was the appropriate rate, rhythm and volume. She spoke slowly and calmly. Her intelligence appears to be within the average range, based on the patient's occupational history, although not formally assessed.    Time spent with patient: 45 minutes (3 units)    DIAGNOSTIC IMPRESSION:  Organic mood  disorder.  Excoriation.  Generalized anxiety disorder.  Dementia, Alzheimer type, Frontotemporal type.  Chronic insomnia.    TREATMENT PLAN/GOALS:   The patient has been referred to outpatient psychologist, Dr. Elisa Becerra, for supportive therapy and to learn behavioral strategies for managing depression and the skin picking disorder (excoriation), including habit reversal training and stimulus control. The assessment with Dr. Becerra is scheduled for 5/23/17 at 1 PM. The evaluating provider will speak with Dr. Becerra to request a sooner appointment. If a sooner appointment is not available, the evaluating provider will treat the patient within the context of the outpatient neurological rehabilitation program in the interim.    The evaluating provider will also coordinate care with the patient's existing psychiatrist, Dr. Sharp, regarding potentially optimizing her medications to help control the skin picking disorder.   Statement Selected

## 2023-04-21 NOTE — OB RN INTRAOPERATIVE NOTE - NS_ELECTROCAUTCOAG_OBGYN_ALL_OB_FT
This note was copied from the mother's chart.  MOB has sign on the door- do not disturb, sleeping.   40

## 2023-04-21 NOTE — OB PROVIDER DELIVERY SUMMARY - NSPROVIDERDELIVERYNOTE_OBGYN_ALL_OB_FT
Single intrauterine pregnancy @39wks+2d for elective pLTCS  Liveborn male infant apgars 8/9, weight 4340g.    QBL: 664  IVF: 2500  UOP: 100    Hysterotomy closed in two layers with vicryl and PDS.   Surgicel powder placed over hysterotomy site.  Uterine atony noted w/ extra Pit given with good effect.      Dictation number: [id number] Single intrauterine pregnancy @39wks+2d for elective pLTCS  Liveborn male infant apgars 8/9, weight 4340g.    QBL: 664cc  IVF: 2500cc  UOP: 100cc    Hysterotomy closed in two layers with vicryl and PDS.   Surgicel powder placed over hysterotomy site.  Uterine atony noted w/ extra Pit, TXA and Hemabate x 1 dose given with good effect.      Dictation number: [id number]

## 2023-04-21 NOTE — OB RN INTRAOPERATIVE NOTE - NSSELHIDDEN_OBGYN_ALL_OB_FT
[NS_[email or web address]_ALL_OB_FT:[id number]],[NS_DeliveryAssist1_OBGYN_ALL_OB_FT:[id number]],[NS_DeliveryRN_OBGYN_ALL_OB_FT:[id number]=]

## 2023-04-21 NOTE — DISCHARGE NOTE OB - PLAN OF CARE
continue pain control as needed   monitor vaginal bleeding continue to monitor blood pressures twice a day at home   continue Procardia daily   RV in one week for blood pressure check After discharge, please stay on pelvic rest for 6 weeks, meaning no sexual intercourse, no tampons and no douching.  No driving for 2 weeks as women can loose a lot of blood during delivery and there is a possibility of being lightheaded/fainting.  No lifting objects heavier than baby for two weeks.  Expect to have vaginal bleeding/spotting for up to six weeks.  The bleeding should get lighter and more white/light brown with time.  For bleeding soaking more than a pad an hour or passing clots greater than the size of your fist, come in to the emergency department.    Follow up in the office in 2 weeks for incision check.  Call your OBGYN for noticeable increase in redness or swelling at incision, discharge from incision, or opening of skin at incision site. Continue to take your blood pressure at least twice a day, and take your medications as prescribed.  Call your OB if your pressures are greater than 150/100, or if you experience severe or persistent headaches, visual changes, persistent nausea/vomiting, trouble breathing, chest pain, or severe abdominal pain. Please follow up with your -OBGYN within 1 week to review your blood pressures.   continue to monitor blood pressures twice a day at home   continue Procardia daily   RV in one week for blood pressure check

## 2023-04-21 NOTE — DISCHARGE NOTE OB - HOSPITAL COURSE
Patient presents for scheduled induction of labor. During this time, blood pressures increased to severe levels requiring Procardia 10mg IR. Patient started on Magnesium Sulfate therapy and Procardia 30mg for maintenance.   Patient received cytotec, cervical balloon and pitocin for induction. Cervical exam remained unchanged at 4cm and high. Patient declined further induction and desired abdominal delivery.   Patient underwent pLTCD without complication. Magnesium sulfate therapy continued for 24h PP. Postpartum course uncomplicated and patient discharged home on POD #   Patient to follow up in one week for blood pressure check.   Patient presents for scheduled induction of labor. During this time, blood pressures increased to severe levels requiring Procardia 10mg IR. Patient started on Magnesium Sulfate therapy and Procardia 30mg for maintenance.   Patient received cytotec, cervical balloon and pitocin for induction. Cervical exam remained unchanged at 4cm and high. Patient declined further induction and desired abdominal delivery.   Patient underwent pLTCD without complication. Magnesium sulfate therapy continued for 24h PP. Postpartum course uncomplicated and patient discharged home on POD # 2  Patient to follow up in one week for blood pressure check.

## 2023-04-21 NOTE — DISCHARGE NOTE OB - MEDICATION SUMMARY - MEDICATIONS TO TAKE
I will START or STAY ON the medications listed below when I get home from the hospital:    ibuprofen 600 mg oral tablet  -- 1 tab(s) by mouth every 6 hours  -- Indication: For moderate pain    acetaminophen 500 mg oral tablet  -- 2 tab(s) by mouth every 6 hours  -- Indication: For mild pain    oxyCODONE 5 mg oral tablet  -- 1 tab(s) by mouth every 4 hours  -- Indication: For Severe pain    NIFEdipine 30 mg oral tablet, extended release  -- 1 tab(s) by mouth every 24 hours  -- Indication: For Severe pre-eclampsia

## 2023-04-21 NOTE — DISCHARGE NOTE OB - CARE PROVIDER_API CALL
Cristine Denny)  Obstetrics and Gynecology  [address] [place] [address]  Phone: [phone]  Fax: [phone]  Follow Up Time:

## 2023-04-21 NOTE — CHART NOTE - NSCHARTNOTEFT_GEN_A_CORE
R1 CHART NOTE    S: Patient assessed for vaginal bleeding after pLTCS. She denies dizziness, SOB, palpitations, chest pain, nausea, vomiting.    Bimanual exam was performed. Lower uterine segment atony noted. 300cc of clots were removed from the uterus. Sono was performed and showed thin endometrial stripe. Cytotec 1000 MS was placed    Vital Signs Last 24 Hrs  T(C): 36.7 (21 Apr 2023 02:40), Max: 37.1 (20 Apr 2023 21:32)  T(F): 98.1 (21 Apr 2023 02:40), Max: 98.78 (20 Apr 2023 21:32)  HR: 99 (21 Apr 2023 03:14) (75 - 111)  BP: 101/70 (21 Apr 2023 03:14) (96/60 - 144/77)  BP(mean): 83 (21 Apr 2023 02:40) (83 - 83)  RR: 20 (21 Apr 2023 03:14) (18 - 20)  SpO2: 93% (21 Apr 2023 03:14) (91% - 99%)    A/P: POD#0 s/p elective pLTCS +300 c/b sPEC. VSS. Bleeding well controlled after bimanual evacuation of clots  - Cytotec 1000 MS administered  - HELLP labs  - AM CBC    Amber Gill, PGY1  Patient seen and examined with Sergo Dickey, PGY3  d/w Dr. Denny

## 2023-04-21 NOTE — OB PROVIDER DELIVERY SUMMARY - NSSELHIDDEN_OBGYN_ALL_OB_FT
[NS_[email or web address]_ALL_OB_FT:[id number]],[NS_DeliveryAssist1_OBGYN_ALL_OB_FT:[id number]],[NS_DeliveryRN_OBGYN_ALL_OB_FT:[id number]=]

## 2023-04-21 NOTE — OB RN DELIVERY SUMMARY - NSSELHIDDEN_OBGYN_ALL_OB_FT
[NS_DeliveryAttending1_OBGYN_ALL_OB_FT:[id number]],[NS_DeliveryAssist1_OBGYN_ALL_OB_FT:[id number]],[NS_DeliveryRN_OBGYN_ALL_OB_FT:[id number]=]

## 2023-04-21 NOTE — DISCHARGE NOTE OB - INSTRUCTIONS
Follow up with MD as directed call if any increased pain, fever, chills, pain on urination, heavy vaginal bleeding.  Call if any headache, dizziness, blurry vision elevated /90. Continue to take procardia 30xl daily as directed.

## 2023-04-21 NOTE — OB PROVIDER DELIVERY SUMMARY - NSCSPRIMARYINDICATIONA_OBGYN_ALL_OB
Encounter addended by: Steve Manley RN on: 1/11/2021 3:49 PM   Actions taken: Charge Capture section accepted
Elective Primary C/S

## 2023-04-22 RX ADMIN — MAGNESIUM HYDROXIDE 30 MILLILITER(S): 400 TABLET, CHEWABLE ORAL at 21:32

## 2023-04-22 RX ADMIN — Medication 30 MILLIGRAM(S): at 17:42

## 2023-04-22 RX ADMIN — Medication 100 MICROGRAM(S): at 05:51

## 2023-04-22 RX ADMIN — Medication 30 MILLIGRAM(S): at 11:55

## 2023-04-22 RX ADMIN — Medication 30 MILLIGRAM(S): at 18:02

## 2023-04-22 RX ADMIN — Medication 975 MILLIGRAM(S): at 21:25

## 2023-04-22 RX ADMIN — Medication 975 MILLIGRAM(S): at 15:50

## 2023-04-22 RX ADMIN — HEPARIN SODIUM 5000 UNIT(S): 5000 INJECTION INTRAVENOUS; SUBCUTANEOUS at 05:03

## 2023-04-22 RX ADMIN — Medication 975 MILLIGRAM(S): at 09:43

## 2023-04-22 RX ADMIN — Medication 30 MILLIGRAM(S): at 01:13

## 2023-04-22 RX ADMIN — Medication 975 MILLIGRAM(S): at 21:53

## 2023-04-22 RX ADMIN — Medication 975 MILLIGRAM(S): at 08:43

## 2023-04-22 RX ADMIN — Medication 30 MILLIGRAM(S): at 00:45

## 2023-04-22 RX ADMIN — Medication 975 MILLIGRAM(S): at 14:50

## 2023-04-22 RX ADMIN — MAGNESIUM HYDROXIDE 30 MILLILITER(S): 400 TABLET, CHEWABLE ORAL at 09:37

## 2023-04-22 RX ADMIN — Medication 30 MILLIGRAM(S): at 12:10

## 2023-04-22 RX ADMIN — Medication 30 MILLIGRAM(S): at 05:03

## 2023-04-22 RX ADMIN — HEPARIN SODIUM 5000 UNIT(S): 5000 INJECTION INTRAVENOUS; SUBCUTANEOUS at 17:43

## 2023-04-22 RX ADMIN — Medication 975 MILLIGRAM(S): at 03:00

## 2023-04-22 RX ADMIN — Medication 30 MILLIGRAM(S): at 17:59

## 2023-04-22 NOTE — PROGRESS NOTE ADULT - ATTENDING COMMENTS
POD1 s/p 1'  section, doing well  #PreE with severe features s/p MgSO4, on procardia 30xL with good control.  -Routine postoperative care   -H/H appropriate   -Reg diet   -DC citlaly Dockery MD

## 2023-04-23 VITALS
DIASTOLIC BLOOD PRESSURE: 82 MMHG | OXYGEN SATURATION: 98 % | SYSTOLIC BLOOD PRESSURE: 113 MMHG | TEMPERATURE: 98 F | HEART RATE: 96 BPM | RESPIRATION RATE: 18 BRPM

## 2023-04-23 PROCEDURE — 85025 COMPLETE CBC W/AUTO DIFF WBC: CPT

## 2023-04-23 PROCEDURE — 59025 FETAL NON-STRESS TEST: CPT

## 2023-04-23 PROCEDURE — 86900 BLOOD TYPING SEROLOGIC ABO: CPT

## 2023-04-23 PROCEDURE — 81001 URINALYSIS AUTO W/SCOPE: CPT

## 2023-04-23 PROCEDURE — 85610 PROTHROMBIN TIME: CPT

## 2023-04-23 PROCEDURE — 86901 BLOOD TYPING SEROLOGIC RH(D): CPT

## 2023-04-23 PROCEDURE — 85384 FIBRINOGEN ACTIVITY: CPT

## 2023-04-23 PROCEDURE — 84156 ASSAY OF PROTEIN URINE: CPT

## 2023-04-23 PROCEDURE — 80053 COMPREHEN METABOLIC PANEL: CPT

## 2023-04-23 PROCEDURE — 85730 THROMBOPLASTIN TIME PARTIAL: CPT

## 2023-04-23 PROCEDURE — 86769 SARS-COV-2 COVID-19 ANTIBODY: CPT

## 2023-04-23 PROCEDURE — 86850 RBC ANTIBODY SCREEN: CPT

## 2023-04-23 PROCEDURE — 88307 TISSUE EXAM BY PATHOLOGIST: CPT

## 2023-04-23 PROCEDURE — 86780 TREPONEMA PALLIDUM: CPT

## 2023-04-23 PROCEDURE — 36415 COLL VENOUS BLD VENIPUNCTURE: CPT

## 2023-04-23 PROCEDURE — 83735 ASSAY OF MAGNESIUM: CPT

## 2023-04-23 PROCEDURE — 83615 LACTATE (LD) (LDH) ENZYME: CPT

## 2023-04-23 PROCEDURE — 84550 ASSAY OF BLOOD/URIC ACID: CPT

## 2023-04-23 PROCEDURE — 59050 FETAL MONITOR W/REPORT: CPT

## 2023-04-23 PROCEDURE — 82570 ASSAY OF URINE CREATININE: CPT

## 2023-04-23 RX ORDER — IBUPROFEN 200 MG
1 TABLET ORAL
Qty: 0 | Refills: 0 | DISCHARGE

## 2023-04-23 RX ORDER — ACETAMINOPHEN 500 MG
2 TABLET ORAL
Qty: 0 | Refills: 0 | DISCHARGE
Start: 2023-04-23

## 2023-04-23 RX ORDER — IBUPROFEN 200 MG
600 TABLET ORAL EVERY 6 HOURS
Refills: 0 | Status: DISCONTINUED | OUTPATIENT
Start: 2023-04-23 | End: 2023-04-23

## 2023-04-23 RX ORDER — OXYCODONE HYDROCHLORIDE 5 MG/1
1 TABLET ORAL
Qty: 0 | Refills: 0 | DISCHARGE
Start: 2023-04-23

## 2023-04-23 RX ORDER — IBUPROFEN 200 MG
1 TABLET ORAL
Qty: 0 | Refills: 0 | DISCHARGE
Start: 2023-04-23

## 2023-04-23 RX ORDER — NIFEDIPINE 30 MG
1 TABLET, EXTENDED RELEASE 24 HR ORAL
Qty: 0 | Refills: 0 | DISCHARGE
Start: 2023-04-23

## 2023-04-23 RX ORDER — ACETAMINOPHEN 500 MG
2 TABLET ORAL
Qty: 0 | Refills: 0 | DISCHARGE

## 2023-04-23 RX ORDER — LEVOTHYROXINE SODIUM 125 MCG
1 TABLET ORAL
Qty: 0 | Refills: 0 | DISCHARGE

## 2023-04-23 RX ADMIN — Medication 975 MILLIGRAM(S): at 02:39

## 2023-04-23 RX ADMIN — HEPARIN SODIUM 5000 UNIT(S): 5000 INJECTION INTRAVENOUS; SUBCUTANEOUS at 05:25

## 2023-04-23 RX ADMIN — Medication 975 MILLIGRAM(S): at 16:00

## 2023-04-23 RX ADMIN — Medication 975 MILLIGRAM(S): at 09:13

## 2023-04-23 RX ADMIN — Medication 30 MILLIGRAM(S): at 01:45

## 2023-04-23 RX ADMIN — Medication 975 MILLIGRAM(S): at 10:15

## 2023-04-23 RX ADMIN — Medication 975 MILLIGRAM(S): at 03:47

## 2023-04-23 RX ADMIN — Medication 100 MICROGRAM(S): at 05:24

## 2023-04-23 RX ADMIN — Medication 30 MILLIGRAM(S): at 00:23

## 2023-04-23 RX ADMIN — Medication 600 MILLIGRAM(S): at 13:18

## 2023-04-23 RX ADMIN — Medication 975 MILLIGRAM(S): at 15:00

## 2023-04-23 RX ADMIN — Medication 600 MILLIGRAM(S): at 12:21

## 2023-04-23 NOTE — PROGRESS NOTE ADULT - SUBJECTIVE AND OBJECTIVE BOX
Day 1 of Anesthesia Pain Management Service    SUBJECTIVE:  Pain Scale Score:          [X] Refer to charted pain scores    THERAPY: Received PF spinal morphine as above    OBJECTIVE:    Sedation:        	[X] Alert	[ ] Drowsy	[ ] Arousable      [ ] Asleep       [ ] Unresponsive    Side Effects:	[X] None	[ ] Nausea	[ ] Vomiting         [ ] Pruritus  		[ ] Weakness            [ ] Numbness	          [ ] Other:    ASSESSMENT/ PLAN  [X] Patient transitioned to prn analgesics  [X] Pain management per primary service, pain service to sign off   [X]Documentation and Verification of current medications
JEMAL FRENCH  MRN-[id number]    Subjective:PT SITTING UP IN BED EATING BREAKFAST. NO C/O    Vital Signs Last 24 Hrs  T(C): 36.8 (2023 06:50), Max: 37.1 (2023 21:32)  T(F): 98.3 (2023 06:50), Max: 98.78 (2023 21:32)  HR: 90 (2023 06:50) (80 - 107)  BP: 123/81 (2023 06:50) (96/60 - 144/77)  BP(mean): 95 (2023 06:50) (83 - 95)  RR: 19 (2023 06:50) (18 - 20)  SpO2: 94% (2023 06:50) (91% - 99%)    Parameters below as of 2023 06:50  Patient On (Oxygen Delivery Method): room air        PHYSICAL EXAM:      Constitutional: OBESE WF NAD        Abdomen: Firm fundus. INCISION C/D/I  Pelvic: Lochia mild                                        REVIEW OF SYSTEMS      General:	    Skin/Breast:  		  Gastrointestinal:	    Genitourinary:	      MEDICATIONS  (STANDING):  acetaminophen     Tablet .. 975 milliGRAM(s) Oral <User Schedule>  diphtheria/tetanus/pertussis (acellular) Vaccine (Adacel) 0.5 milliLiter(s) IntraMuscular once  ibuprofen  Tablet. 600 milliGRAM(s) Oral every 6 hours  ketorolac   Injectable 30 milliGRAM(s) IV Push every 6 hours  lactated ringers. 1000 milliLiter(s) (125 mL/Hr) IV Continuous <Continuous>  levothyroxine 100 MICROGram(s) Oral daily  magnesium sulfate Infusion 2 Gm/Hr (50 mL/Hr) IV Continuous <Continuous>  misoprostol 1000 MICROGram(s) Rectal once  morphine PF Epidural 2 milliGRAM(s) Epidural once  NIFEdipine XL 30 milliGRAM(s) Oral every 24 hours  oxytocin Infusion 333.333 milliUNIT(s)/Min (1000 mL/Hr) IV Continuous <Continuous>    MEDICATIONS  (PRN):  dexAMETHasone  Injectable 4 milliGRAM(s) IV Push every 6 hours PRN Nausea  diphenhydrAMINE 25 milliGRAM(s) Oral every 4 hours PRN Pruritus  diphenhydrAMINE 25 milliGRAM(s) Oral every 6 hours PRN Pruritus  lanolin Ointment 1 Application(s) Topical every 6 hours PRN Sore Nipples  magnesium hydroxide Suspension 30 milliLiter(s) Oral two times a day PRN Constipation  nalbuphine Injectable 2.5 milliGRAM(s) IV Push every 6 hours PRN Pruritus  naloxone Injectable 0.1 milliGRAM(s) IV Push every 3 minutes PRN For ANY of the following changes in patient status:  A. Breaths Per Minute LESS THAN 10, B. Oxygen saturation LESS THAN 90%, C. Sedation score of 6 for Stop After: 4 Times  ondansetron Injectable 4 milliGRAM(s) IV Push every 6 hours PRN Nausea  oxyCODONE    IR 5 milliGRAM(s) Oral every 3 hours PRN Moderate to Severe Pain (4-10)  oxyCODONE    IR 5 milliGRAM(s) Oral once PRN Moderate to Severe Pain (4-10)  simethicone 80 milliGRAM(s) Chew every 4 hours PRN Gas    Allergies    No Known Allergies    Intolerances  H/H=12.7/37.9, QFR=367B. LFTS NL. MG=5.3  MGSO4 TO BE D/C'D 0130 .  CONT PROCARDIA 30XL   FOR CIRC     J CAFARO      Impression:    Plan:
OB Progress Note:  Delivery    S: Patient seen and examined at bedside. Pain well controlled, tolerating regular diet, not yet passing flatus, ambulating without difficulty, voiding spontaneously. Denies heavy vaginal bleeding, N/V, CP/SOB/lightheadedness/dizziness. Pt had no severe range BPs overnight. Pt denies HA, changes in vision, upper abdominal pain, or any other concerns.    O:   Vital Signs Last 24 Hrs  T(C): 36.7 (2023 00:15), Max: 36.8 (2023 04:53)  T(F): 98.1 (2023 00:15), Max: 98.3 (2023 06:50)  HR: 80 (2023 00:15) (80 - 103)  BP: 109/75 (2023 00:15) (98/63 - 123/81)  BP(mean): 95 (2023 06:50) (95 - 95)  RR: 18 (2023 00:15) (18 - 19)  SpO2: 97% (2023 00:15) (94% - 99%)    Parameters below as of 2023 00:15  Patient On (Oxygen Delivery Method): room air        Labs:  Blood type: A Positive  Rubella IgG: RPR: Negative                          12.7   12.06<H> >-----------< 195    (  @ 04:19 )             37.9                        12.9   9.09 >-----------< 194    (  @ 15:28 )             37.9                        12.9   6.50 >-----------< 195    (  @ 19:50 )             38.4    23 @ 04:19      134<L>  |  103  |  8   ----------------------------<  106<H>  4.0   |  21<L>  |  0.65    23 @ 15:28      135  |  103  |  9   ----------------------------<  83  4.1   |  22  |  0.65    23 @ 19:50      140  |  106  |  14  ----------------------------<  96  3.8   |  23  |  0.62        Ca    7.6<L>      2023 04:19  Ca    7.9<L>      2023 15:28  Ca    9.9      2023 19:50  Mg     6.2<H>     04-21  Mg     6.0<H>     04-21  Mg     5.7<H>     04-21  Mg     5.3<H>     04-21  Mg     5.4<H>     04-20  Mg     5.1<H>     04-20  Mg     4.3<H>     04-20    TPro  5.9<L>  /  Alb  3.1<L>  /  TBili  0.3  /  DBili  x   /  AST  18  /  ALT  12  /  AlkPhos  111  23 @ 04:19  TPro  5.9<L>  /  Alb  3.1<L>  /  TBili  0.4  /  DBili  x   /  AST  19  /  ALT  12  /  AlkPhos  112  23 @ 15:28  TPro  6.0  /  Alb  3.4  /  TBili  0.3  /  DBili  x   /  AST  20  /  ALT  14  /  AlkPhos  109  23 @ 19:50          PE:  General: NAD  Abdomen: Mildly distended, appropriately tender, Fundus firm, incision c/d/i.  VE: No heavy vaginal bleeding  Extremities: No erythema, no pitting edema    
Day 1 of Anesthesia Pain Management Service    SUBJECTIVE: Doing ok  Pain Scale Score:          [X] Refer to charted pain scores    THERAPY:  s/p   2  mg PF epidural morphine on 4\21\2023      MEDICATIONS  (STANDING):  acetaminophen     Tablet  975 milliGRAM(s) Oral <User Schedule>  diphtheria/tetanus/pertussis (acellular) Vaccine (Adacel) 0.5 milliLiter(s) IntraMuscular once  heparin   Injectable 5000 Unit(s) SubCutaneous every 12 hours  ibuprofen  Tablet. 600 milliGRAM(s) Oral every 6 hours  ketorolac   Injectable 30 milliGRAM(s) IV Push every 6 hours  lactated ringers. 1000 milliLiter(s) (125 mL/Hr) IV Continuous <Continuous>  levothyroxine 100 MICROGram(s) Oral daily  magnesium sulfate Infusion 2 Gm/Hr (50 mL/Hr) IV Continuous <Continuous>  misoprostol 1000 MICROGram(s) Rectal once  morphine PF Epidural 2 milliGRAM(s) Epidural once  NIFEdipine XL 30 milliGRAM(s) Oral every 24 hours  oxytocin Infusion 333.333 milliUNIT(s)/Min (1000 mL/Hr) IV Continuous <Continuous>    MEDICATIONS  (PRN):  dexAMETHasone  Injectable 4 milliGRAM(s) IV Push every 6 hours PRN Nausea  diphenhydrAMINE 25 milliGRAM(s) Oral every 4 hours PRN Pruritus  diphenhydrAMINE 25 milliGRAM(s) Oral every 6 hours PRN Pruritus  lanolin Ointment 1 Application(s) Topical every 6 hours PRN Sore Nipples  magnesium hydroxide Suspension 30 milliLiter(s) Oral two times a day PRN Constipation  nalbuphine Injectable 2.5 milliGRAM(s) IV Push every 6 hours PRN Pruritus  naloxone Injectable 0.1 milliGRAM(s) IV Push every 3 minutes PRN For ANY of the following changes in patient status:  A. Breaths Per Minute LESS THAN 10, B. Oxygen saturation LESS THAN 90%, C. Sedation score of 6 for Stop After: 4 Times  ondansetron Injectable 4 milliGRAM(s) IV Push every 6 hours PRN Nausea  oxyCODONE    IR 5 milliGRAM(s) Oral every 3 hours PRN Moderate to Severe Pain (4-10)  oxyCODONE    IR 5 milliGRAM(s) Oral once PRN Moderate to Severe Pain (4-10)  simethicone 80 milliGRAM(s) Chew every 4 hours PRN Gas      OBJECTIVE:    Sedation:        	[X] Alert	 [ ] Drowsy	[ ] Arousable      [ ] Asleep       [ ] Unresponsive    Side Effects:	[X] None 	[ ] Nausea	[ ] Vomiting         [ ] Pruritus  		[ ] Weakness            [ ] Numbness	          [ ] Other:    Vital Signs Last 24 Hrs  T(C): 36.5 (21 Apr 2023 09:00), Max: 37.1 (20 Apr 2023 21:32)  T(F): 97.7 (21 Apr 2023 09:00), Max: 98.78 (20 Apr 2023 21:32)  HR: 87 (21 Apr 2023 11:00) (80 - 107)  BP: 98/63 (21 Apr 2023 11:00) (96/60 - 144/77)  BP(mean): 95 (21 Apr 2023 06:50) (83 - 95)  RR: 18 (21 Apr 2023 09:00) (18 - 20)  SpO2: 96% (21 Apr 2023 09:00) (91% - 99%)    Parameters below as of 21 Apr 2023 09:00  Patient On (Oxygen Delivery Method): room air        ASSESSMENT/ PLAN  [X] Patient transitioned to prn analgesics  [X] Pain management per primary service, pain service to sign off   [X]Documentation and Verification of current medications
Patient seen and examined at bedside, no acute overnight events. No acute concerns, pain well controlled. Patient is ambulating, voiding spontaneously, passing flatus, and tolerating regular diet. Denies CP, SOB, N/V, HA, blurred vision, epigastric pain.    Vital Signs Last 24 Hours  T(C): 36.8 (04-23-23 @ 00:28), Max: 36.9 (04-22-23 @ 14:00)  HR: 97 (04-23-23 @ 00:28) (79 - 97)  BP: 130/76 (04-23-23 @ 00:28) (107/61 - 135/87)  RR: 18 (04-23-23 @ 00:28) (18 - 18)  SpO2: 97% (04-23-23 @ 00:28) (96% - 99%)    Physical Exam:  General: NAD  Abdomen: Soft, non-tender, non-distended, fundus firm  Incision: Pfannensteil incision CDI, subcuticular suture closure  Pelvic: Lochia wnl    Labs:    Blood Type: A Positive  Antibody Screen: Negative  RPR: Negative               12.7   12.06 )-----------( 195      ( 04-21 @ 04:19 )             37.9                12.9   9.09  )-----------( 194      ( 04-20 @ 15:28 )             37.9                12.9   6.50  )-----------( 195      ( 04-19 @ 19:50 )             38.4         MEDICATIONS  (STANDING):  acetaminophen     Tablet .. 975 milliGRAM(s) Oral <User Schedule>  diphtheria/tetanus/pertussis (acellular) Vaccine (Adacel) 0.5 milliLiter(s) IntraMuscular once  heparin   Injectable 5000 Unit(s) SubCutaneous every 12 hours  ibuprofen  Tablet. 600 milliGRAM(s) Oral every 6 hours  lactated ringers. 1000 milliLiter(s) (125 mL/Hr) IV Continuous <Continuous>  levothyroxine 100 MICROGram(s) Oral daily  magnesium sulfate Infusion 2 Gm/Hr (50 mL/Hr) IV Continuous <Continuous>  misoprostol 1000 MICROGram(s) Rectal once  NIFEdipine XL 30 milliGRAM(s) Oral every 24 hours  oxytocin Infusion 333.333 milliUNIT(s)/Min (1000 mL/Hr) IV Continuous <Continuous>    MEDICATIONS  (PRN):  diphenhydrAMINE 25 milliGRAM(s) Oral every 6 hours PRN Pruritus  lanolin Ointment 1 Application(s) Topical every 6 hours PRN Sore Nipples  magnesium hydroxide Suspension 30 milliLiter(s) Oral two times a day PRN Constipation  oxyCODONE    IR 5 milliGRAM(s) Oral every 3 hours PRN Moderate to Severe Pain (4-10)  oxyCODONE    IR 5 milliGRAM(s) Oral once PRN Moderate to Severe Pain (4-10)  simethicone 80 milliGRAM(s) Chew every 4 hours PRN Gas

## 2023-04-23 NOTE — PROGRESS NOTE ADULT - ASSESSMENT
A/P: 41yo POD#2 s/p LTCS c/b sPEC. QBL: 664/ On POD#0 patient had a mild PPH of 300 s/p cytotec AK. No bleeding issues since. BPs normotensive. Patient asymptomatic and meeting post op milestones.     #sPEC  -HELLP wnl  -s/p Magnesium for seizure prophylaxis  -c/w Proc 30XL for BP control    #Postpartum  - Continue regular diet  - SCDs, HSQ, early ambulation for DVT ppx  - Continue motrin, tylenol, oxycodone PRN for pain control.      Yaneth Faulkner, PGY-3
A/P: 43yo POD#1 s/p LTCS c/b sPEC. QBL: 664/ On POD#0 patient had a mild PPH of 300 s/p cytotec [place]. No bleeding issues since. BPs normotensive. Patient asymptomatic and meeting post op milestones.     #sPEC  -HELLP wnl  -s/p Magnesium for seizure prophylaxis  -c/w Proc 30XL for BP control    #Postpartum  - Continue regular diet  - SCDs, HSQ, early ambulation for DVT ppx  - Continue motrin, tylenol, oxycodone PRN for pain control.        Sergo Dickey, PGY-3

## 2023-04-23 NOTE — PROGRESS NOTE ADULT - ATTENDING COMMENTS
POD2 s/p 1'  section, doing well  #sPEC- S/P Magnesium, BPs are wnl on procardia 30xL  -Routine postoperative care   -Reg diet   -OOB    Delaney Dockery MD POD2 s/p 1'  section, doing well  #sPEC- S/P Magnesium, BPs are wnl on procardia 30xL  -Routine postoperative care   -Reg diet   -OOB    Pt requesting discharge today, will f/u 1 week with BPs.    Delaney Dockery MD

## 2023-04-26 ENCOUNTER — APPOINTMENT (OUTPATIENT)
Age: 43
End: 2023-04-26
Payer: COMMERCIAL

## 2023-04-26 ENCOUNTER — INPATIENT (INPATIENT)
Facility: HOSPITAL | Age: 43
LOS: 0 days | Discharge: HOME | DRG: 776 | End: 2023-04-27
Attending: OBSTETRICS & GYNECOLOGY | Admitting: OBSTETRICS & GYNECOLOGY
Payer: COMMERCIAL

## 2023-04-26 VITALS
DIASTOLIC BLOOD PRESSURE: 88 MMHG | OXYGEN SATURATION: 100 % | WEIGHT: 253.97 LBS | TEMPERATURE: 98 F | SYSTOLIC BLOOD PRESSURE: 141 MMHG | HEIGHT: 65 IN | HEART RATE: 115 BPM

## 2023-04-26 DIAGNOSIS — Z98.890 OTHER SPECIFIED POSTPROCEDURAL STATES: Chronic | ICD-10-CM

## 2023-04-26 LAB
ALBUMIN SERPL ELPH-MCNC: 3.8 G/DL — SIGNIFICANT CHANGE UP (ref 3.3–5)
ALP SERPL-CCNC: 88 U/L — SIGNIFICANT CHANGE UP (ref 40–120)
ALT FLD-CCNC: 33 U/L — SIGNIFICANT CHANGE UP (ref 10–45)
ANION GAP SERPL CALC-SCNC: 13 MMOL/L — SIGNIFICANT CHANGE UP (ref 5–17)
APPEARANCE UR: CLEAR — SIGNIFICANT CHANGE UP
APTT BLD: 25.6 SEC — LOW (ref 27.5–35.5)
AST SERPL-CCNC: 33 U/L — SIGNIFICANT CHANGE UP (ref 10–40)
BASOPHILS # BLD AUTO: 0.03 K/UL — SIGNIFICANT CHANGE UP (ref 0–0.2)
BASOPHILS NFR BLD AUTO: 0.4 % — SIGNIFICANT CHANGE UP (ref 0–2)
BILIRUB SERPL-MCNC: 0.2 MG/DL — SIGNIFICANT CHANGE UP (ref 0.2–1.2)
BILIRUB UR-MCNC: NEGATIVE — SIGNIFICANT CHANGE UP
BUN SERPL-MCNC: 14 MG/DL — SIGNIFICANT CHANGE UP (ref 7–23)
CALCIUM SERPL-MCNC: 9.7 MG/DL — SIGNIFICANT CHANGE UP (ref 8.4–10.5)
CHLORIDE SERPL-SCNC: 106 MMOL/L — SIGNIFICANT CHANGE UP (ref 96–108)
CO2 SERPL-SCNC: 24 MMOL/L — SIGNIFICANT CHANGE UP (ref 22–31)
COLOR SPEC: SIGNIFICANT CHANGE UP
CREAT ?TM UR-MCNC: 45 MG/DL — SIGNIFICANT CHANGE UP
CREAT SERPL-MCNC: 0.76 MG/DL — SIGNIFICANT CHANGE UP (ref 0.5–1.3)
DIFF PNL FLD: ABNORMAL
EGFR: 102 ML/MIN/1.73M2 — SIGNIFICANT CHANGE UP
EOSINOPHIL # BLD AUTO: 0.2 K/UL — SIGNIFICANT CHANGE UP (ref 0–0.5)
EOSINOPHIL NFR BLD AUTO: 2.5 % — SIGNIFICANT CHANGE UP (ref 0–6)
GLUCOSE SERPL-MCNC: 88 MG/DL — SIGNIFICANT CHANGE UP (ref 70–99)
GLUCOSE UR QL: NEGATIVE — SIGNIFICANT CHANGE UP
HCT VFR BLD CALC: 38.5 % — SIGNIFICANT CHANGE UP (ref 34.5–45)
HGB BLD-MCNC: 12.4 G/DL — SIGNIFICANT CHANGE UP (ref 11.5–15.5)
IMM GRANULOCYTES NFR BLD AUTO: 1 % — HIGH (ref 0–0.9)
INR BLD: 0.9 RATIO — SIGNIFICANT CHANGE UP (ref 0.88–1.16)
KETONES UR-MCNC: NEGATIVE — SIGNIFICANT CHANGE UP
LDH SERPL L TO P-CCNC: 260 U/L — HIGH (ref 50–242)
LEUKOCYTE ESTERASE UR-ACNC: NEGATIVE — SIGNIFICANT CHANGE UP
LYMPHOCYTES # BLD AUTO: 1.52 K/UL — SIGNIFICANT CHANGE UP (ref 1–3.3)
LYMPHOCYTES # BLD AUTO: 19.4 % — SIGNIFICANT CHANGE UP (ref 13–44)
MCHC RBC-ENTMCNC: 30.4 PG — SIGNIFICANT CHANGE UP (ref 27–34)
MCHC RBC-ENTMCNC: 32.2 GM/DL — SIGNIFICANT CHANGE UP (ref 32–36)
MCV RBC AUTO: 94.4 FL — SIGNIFICANT CHANGE UP (ref 80–100)
MONOCYTES # BLD AUTO: 0.4 K/UL — SIGNIFICANT CHANGE UP (ref 0–0.9)
MONOCYTES NFR BLD AUTO: 5.1 % — SIGNIFICANT CHANGE UP (ref 2–14)
NEUTROPHILS # BLD AUTO: 5.62 K/UL — SIGNIFICANT CHANGE UP (ref 1.8–7.4)
NEUTROPHILS NFR BLD AUTO: 71.6 % — SIGNIFICANT CHANGE UP (ref 43–77)
NITRITE UR-MCNC: NEGATIVE — SIGNIFICANT CHANGE UP
NRBC # BLD: 0 /100 WBCS — SIGNIFICANT CHANGE UP (ref 0–0)
PH UR: 6.5 — SIGNIFICANT CHANGE UP (ref 5–8)
PLATELET # BLD AUTO: 368 K/UL — SIGNIFICANT CHANGE UP (ref 150–400)
POTASSIUM SERPL-MCNC: 4.4 MMOL/L — SIGNIFICANT CHANGE UP (ref 3.5–5.3)
POTASSIUM SERPL-SCNC: 4.4 MMOL/L — SIGNIFICANT CHANGE UP (ref 3.5–5.3)
PROT ?TM UR-MCNC: 10 MG/DL — SIGNIFICANT CHANGE UP (ref 0–12)
PROT SERPL-MCNC: 6.9 G/DL — SIGNIFICANT CHANGE UP (ref 6–8.3)
PROT UR-MCNC: NEGATIVE — SIGNIFICANT CHANGE UP
PROT/CREAT UR-RTO: 0.2 RATIO — SIGNIFICANT CHANGE UP (ref 0–0.2)
PROTHROM AB SERPL-ACNC: 10.3 SEC — LOW (ref 10.5–13.4)
RBC # BLD: 4.08 M/UL — SIGNIFICANT CHANGE UP (ref 3.8–5.2)
RBC # FLD: 14 % — SIGNIFICANT CHANGE UP (ref 10.3–14.5)
SODIUM SERPL-SCNC: 143 MMOL/L — SIGNIFICANT CHANGE UP (ref 135–145)
SP GR SPEC: 1.01 — SIGNIFICANT CHANGE UP (ref 1.01–1.02)
URATE SERPL-MCNC: 6.7 MG/DL — SIGNIFICANT CHANGE UP (ref 2.5–7)
UROBILINOGEN FLD QL: NEGATIVE — SIGNIFICANT CHANGE UP
WBC # BLD: 7.85 K/UL — SIGNIFICANT CHANGE UP (ref 3.8–10.5)
WBC # FLD AUTO: 7.85 K/UL — SIGNIFICANT CHANGE UP (ref 3.8–10.5)

## 2023-04-26 PROCEDURE — S9443: CPT | Mod: 95

## 2023-04-26 PROCEDURE — 99285 EMERGENCY DEPT VISIT HI MDM: CPT

## 2023-04-26 PROCEDURE — 70450 CT HEAD/BRAIN W/O DYE: CPT | Mod: 26

## 2023-04-26 RX ORDER — MAGNESIUM SULFATE 500 MG/ML
2 VIAL (ML) INJECTION
Qty: 40 | Refills: 0 | Status: DISCONTINUED | OUTPATIENT
Start: 2023-04-26 | End: 2023-04-27

## 2023-04-26 RX ORDER — ACETAMINOPHEN 500 MG
975 TABLET ORAL
Refills: 0 | Status: DISCONTINUED | OUTPATIENT
Start: 2023-04-26 | End: 2023-04-27

## 2023-04-26 RX ORDER — OXYCODONE HYDROCHLORIDE 5 MG/1
5 TABLET ORAL
Refills: 0 | Status: DISCONTINUED | OUTPATIENT
Start: 2023-04-26 | End: 2023-04-27

## 2023-04-26 RX ORDER — SODIUM CHLORIDE 9 MG/ML
1000 INJECTION, SOLUTION INTRAVENOUS
Refills: 0 | Status: DISCONTINUED | OUTPATIENT
Start: 2023-04-26 | End: 2023-04-26

## 2023-04-26 RX ORDER — METOCLOPRAMIDE HCL 10 MG
10 TABLET ORAL ONCE
Refills: 0 | Status: COMPLETED | OUTPATIENT
Start: 2023-04-26 | End: 2023-04-26

## 2023-04-26 RX ORDER — LABETALOL HCL 100 MG
200 TABLET ORAL EVERY 12 HOURS
Refills: 0 | Status: DISCONTINUED | OUTPATIENT
Start: 2023-04-26 | End: 2023-04-27

## 2023-04-26 RX ORDER — SIMETHICONE 80 MG/1
80 TABLET, CHEWABLE ORAL EVERY 4 HOURS
Refills: 0 | Status: DISCONTINUED | OUTPATIENT
Start: 2023-04-26 | End: 2023-04-27

## 2023-04-26 RX ORDER — OXYCODONE HYDROCHLORIDE 5 MG/1
5 TABLET ORAL ONCE
Refills: 0 | Status: DISCONTINUED | OUTPATIENT
Start: 2023-04-26 | End: 2023-04-27

## 2023-04-26 RX ORDER — IBUPROFEN 200 MG
600 TABLET ORAL EVERY 6 HOURS
Refills: 0 | Status: DISCONTINUED | OUTPATIENT
Start: 2023-04-26 | End: 2023-04-27

## 2023-04-26 RX ORDER — LANOLIN
1 OINTMENT (GRAM) TOPICAL EVERY 6 HOURS
Refills: 0 | Status: DISCONTINUED | OUTPATIENT
Start: 2023-04-26 | End: 2023-04-27

## 2023-04-26 RX ORDER — LABETALOL HCL 100 MG
200 TABLET ORAL ONCE
Refills: 0 | Status: COMPLETED | OUTPATIENT
Start: 2023-04-26 | End: 2023-04-26

## 2023-04-26 RX ORDER — IBUPROFEN 200 MG
600 TABLET ORAL EVERY 6 HOURS
Refills: 0 | Status: COMPLETED | OUTPATIENT
Start: 2023-04-26 | End: 2024-03-24

## 2023-04-26 RX ORDER — DIPHENHYDRAMINE HCL 50 MG
25 CAPSULE ORAL ONCE
Refills: 0 | Status: COMPLETED | OUTPATIENT
Start: 2023-04-26 | End: 2023-04-26

## 2023-04-26 RX ORDER — DIPHENHYDRAMINE HCL 50 MG
25 CAPSULE ORAL EVERY 6 HOURS
Refills: 0 | Status: DISCONTINUED | OUTPATIENT
Start: 2023-04-26 | End: 2023-04-27

## 2023-04-26 RX ORDER — MAGNESIUM SULFATE 500 MG/ML
4 VIAL (ML) INJECTION ONCE
Refills: 0 | Status: COMPLETED | OUTPATIENT
Start: 2023-04-26 | End: 2023-04-26

## 2023-04-26 RX ORDER — SODIUM CHLORIDE 9 MG/ML
1000 INJECTION, SOLUTION INTRAVENOUS
Refills: 0 | Status: DISCONTINUED | OUTPATIENT
Start: 2023-04-26 | End: 2023-04-27

## 2023-04-26 RX ORDER — MAGNESIUM HYDROXIDE 400 MG/1
30 TABLET, CHEWABLE ORAL
Refills: 0 | Status: DISCONTINUED | OUTPATIENT
Start: 2023-04-26 | End: 2023-04-27

## 2023-04-26 RX ADMIN — Medication 975 MILLIGRAM(S): at 22:16

## 2023-04-26 RX ADMIN — Medication 200 MILLIGRAM(S): at 18:21

## 2023-04-26 RX ADMIN — Medication 25 MILLIGRAM(S): at 18:21

## 2023-04-26 RX ADMIN — Medication 10 MILLIGRAM(S): at 18:52

## 2023-04-26 RX ADMIN — Medication 300 GRAM(S): at 18:21

## 2023-04-26 RX ADMIN — Medication 975 MILLIGRAM(S): at 22:50

## 2023-04-26 RX ADMIN — Medication 50 GM/HR: at 19:41

## 2023-04-26 NOTE — ED PROVIDER NOTE - NS ED ROS FT
CONSTITUTIONAL: No weakness, fevers or chills  EYES/ENT: No visual changes;  No vertigo or throat pain   NECK: No pain or stiffness  RESPIRATORY: No cough, wheezing, hemoptysis; No shortness of breath  CARDIOVASCULAR: No chest pain or palpitations  GASTROINTESTINAL: No abdominal or epigastric pain. No nausea, vomiting, or hematemesis; No diarrhea or constipation. No melena or hematochezia.  GENITOURINARY: No dysuria, frequency or hematuria  NEUROLOGICAL: + headache  SKIN: No itching, burning, rashes, or lesions   All other review of systems is negative unless indicated above.

## 2023-04-26 NOTE — ED PROVIDER NOTE - PHYSICAL EXAMINATION
PHYSICAL EXAM:  GENERAL: NAD, lying in bed comfortably  HEAD:  Atraumatic, Normocephalic  EYES: EOMI, PERRLA, conjunctiva and sclera clear  ENT: No erythema/pallor/petechiae/lesions; TMs clear b/l  NECK: Supple, No JVD  LUNG: CTA b/l; no r/r/w  HEART: RRR, +S1/S2; No m/r/g  ABDOMEN: soft, NT/ND; BS audible   EXTREMITIES:  2+ Peripheral Pulses, brisk cap refill. No clubbing, cyanosis, or edema  NERVOUS SYSTEM:  AAOx3, speech clear. No sensory/motor deficits; cranial nerves 2-12 grossly intact bilaterally, negative pronator drift, cerebellar signs intact - finger-to-nose exam, no meningismus, ambulates with steady gait   MSK: FROM all 4 extremities, full and equal strength  SKIN: No rashes or lesions

## 2023-04-26 NOTE — ED ADULT NURSE NOTE - OBJECTIVE STATEMENT
Pt is a 42y F AxO4 presenting to the ED with preeclampsia. Pt states Pt is a 42y F AxO4 presenting to the ED with preeclampsia. No significant PMH. Pt had a C section delivery x5 days ago and was diagnosed with preeclampsia at labor. Delivery had no complications, this is her 3rd pregnancy, 1st delivery. Last night pt took her BP and found it to be 150/90, today she woke up with a frontal headache and pressure 140/90I incision site clean and dry, breathing unlabored, skin warm dry intact. Denies vision changes, chest pain, SOB, fever, chills. Well appearing, speaking full sentences, maintained on monitor, VSS, no acute distress. Pt is a 42y F AxO4 presenting to the ED with preeclampsia. No significant PMH. Pt had a C section delivery x5 days ago and was diagnosed with preeclampsia at labor. Delivery had no complications, this is her 3rd pregnancy, 1st delivery. Last night pt took her BP and found it to be 150/90, today she woke up with a frontal headache and pressure 140/90. incision site clean and dry, breathing unlabored, skin warm dry intact. Denies vision changes, chest pain, SOB, fever, chills. Well appearing, speaking full sentences, maintained on monitor, VSS, no acute distress.

## 2023-04-26 NOTE — H&P ADULT - NSHPLABSRESULTS_GEN_ALL_CORE
12.4   7.85  )-----------( 368      ( 26 Apr 2023 17:21 )             38.5     04-26    143  |  106  |  14  ----------------------------<  88  4.4   |  24  |  0.76    Ca    9.7      26 Apr 2023 17:21  Mg     1.8     04-26    TPro  6.9  /  Alb  3.8  /  TBili  0.2  /  DBili  x   /  AST  33  /  ALT  33  /  AlkPhos  88  04-26    PT/INR - ( 26 Apr 2023 17:21 )   PT: 10.3 sec;   INR: 0.90 ratio       PTT - ( 26 Apr 2023 17:21 )  PTT:25.6 sec

## 2023-04-26 NOTE — ED ADULT TRIAGE NOTE - ACCOMPANIED BY
Nurse note from patient's weekly / LCD / Maintenance class was reviewed. Pertinent medical concerns were: Went up, needs more consultation w dietitian   BP Readings from Last 3 Encounters:   01/28/22 123/83   01/14/22 122/86   12/27/21 116/70       Weight Metrics 1/28/2022 1/14/2022 12/27/2021 12/13/2021 11/22/2021 11/12/2021 10/29/2021   Weight 206 lb 8 oz 206 lb 12.8 oz 204 lb 12.8 oz 207 lb 9.6 oz 205 lb 11.2 oz 204 lb 12.8 oz 203 lb 4.8 oz   Neck Circ (inches) - - - - - - -   Waist Measure Inches - - - - - - -   Body Fat % - - - - - - -   BMI 35.45 kg/m2 35.5 kg/m2 34.88 kg/m2 35.36 kg/m2 35.03 kg/m2 34.88 kg/m2 36.01 kg/m2       Current Outpatient Medications   Medication Sig Dispense Refill    levothyroxine (SYNTHROID) 25 mcg tablet Take 1 Tablet by mouth Daily (before breakfast). 90 Tablet 1    acyclovir (ZOVIRAX) 5 % ointment Apply to affected area 6 times a day 30 g 1    scopolamine (TRANSDERM-SCOP) 1 mg over 3 days pt3d 1 Patch by TransDERmal route every seventy-two (72) hours. 3 Patch 0    hydroCHLOROthiazide (MICROZIDE) 12.5 mg capsule Take 1 Cap by mouth daily. (Patient taking differently: Take 12.5 mg by mouth as needed.) 90 Cap 3    mometasone (NASONEX) 50 mcg/actuation nasal spray USE TWO SPRAY(S) IN EACH NOSTRIL DAILY 1 Container 11    albuterol (VENTOLIN HFA) 90 mcg/actuation inhaler Take 2 Puffs by inhalation every four (4) hours as needed for Wheezing.  1 Inhaler 11    glucose blood VI test strips (CONTOUR NEXT STRIPS) strip qd 100 Strip 11 Immediate family member

## 2023-04-26 NOTE — ED PROVIDER NOTE - CLINICAL SUMMARY MEDICAL DECISION MAKING FREE TEXT BOX
ap- 42 F w/ delivery on april 21, here w/ elevated BP. Pt reports that she required magnesium for seizure ppx, and is on procardia 30 for BP control. Last night she was w/ 150/90s, this AM called obgyn recommending to take procardia 30 BID, pt took this AM at approx 9 AM, pt w/ no cp, no sob, no nausea no vomiting, intermittent gradual h/a in the frontal area, no fevers, no chills, no blurry vision, no abd pain, reports she is on q6 apap and motrin.  today BP was 140/90s. pt called obgyn who recommended ER. Upon arrival pt hypertensive, on exam, I have reviewed the triage vital signs. Const: no acute distress, Well-developed, Eyes: no conjunctival injection and no scleral icterus ENMT: Moist mucus membranes, CVS: +S1/S2, radial/DP pulse 2+ bilaterally RESP: Unlabored respiratory effort, Clear to auscultation bilaterally GI: Nontender/Nondistended soft abdomen, no CVA tenderness MSK: Extremities w/o deformity or ttp, Psych: Awake, Alert, & Orientedx3;  Appropriate mood and affect, cooperative Neuro: 5/5 bilateral elbow flexion/extension, 5/5 bilateral wrist flexion/extension, 5/5 bilateral hand , 5/5 bilateral hip flexion/extension, 5/5 bilateral knee flexion/extension, 5/5 bilateral dorsiflexion/plantarflexion, intact sensation in the bilateral arms and legs to light touch, normal finger to nose bilaterally w/ no dysmetria, 2+ reflexes in lower extremities, EOMI, CN2-12 intact, pupils are 4 mm and reactive bilaterally  Plan for labs and reassessment. dispo pending results, obgyn consult, concern for hypertension in post partum period, pt reports child is healthy at home

## 2023-04-26 NOTE — H&P ADULT - HISTORY OF PRESENT ILLNESS
R1 H&P  JEMAL FRENCH  42y  Female [id number]    HPI: Patient is a 43yo  now POD#5 from pLTCS c/b preeclampsia with severe features s/p Mg.  The patient reports that for the past few days since her discharge her BPs have been moderately elevated  140s-150s/90s-100s.  This morning, she also started to have a frontal headache that she reported as 6/10 pain and not improving with Tylenol and Motrin. She has been taking Tylenol and Motrin around the clock since her C/S. She was discharged on Procardia 30 daily, and was instructed to take it BID starting this morning. She had a dose at 6AM. She called the office to discuss her headache and was instructed to come to the ED. She also reports that she hasn't been sleeping well because she is frequently up breastfeeding her baby. She denies vision changes, CP, SOB, N/V, RUQ pain.      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   The patient reports that for the past few days since her discharge her BPs have been moderately elevated  140s-150s/90s-100s. She was told to increase Procardia to 30XL BID.  This morning after she took her Procardia, she also started to have a frontal headache. Reports severity of 6/10  and not improving with Tylenol and Motrin. She has been taking Tylenol and Motrin around the clock since her C/S. She was discharged on Procardia 30 daily, and was instructed to take it BID starting this morning. She had last dose at 6AM. She called the office to discuss her headache and was instructed to come to the ED. She also reports that she hasn't been sleeping well because she is frequently up breastfeeding her baby. She denies vision changes, CP, SOB, N/V, RUQ pain.      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

## 2023-04-26 NOTE — ED PROVIDER NOTE - MDM ORDERS SUBMITTED SELECTION
Labs/Medications Clindamycin Pregnancy And Lactation Text: This medication can be used in pregnancy if certain situations. Clindamycin is also present in breast milk.

## 2023-04-26 NOTE — H&P ADULT - ASSESSMENT
Patient is a 41yo  now POD#5 from pLTCS c/b preeclampsia with severe features s/p Mg now presenting with a headache and moderate range BPs     Patient is a 43yo  now POD#5 from pLTCS c/b preeclampsia with severe features s/p Mg now presenting with a headache and mild-range BPs. Ongoing neurological sx (refractory HA) concerning for worsening sPEC vs. medication side effect from Procardia. Will admit for workup and magnesium for seizure prophylaxis.   - Admit to Antepartum   - Reglan, Benadryl for HA  - Start Mg bolus  - CT Head for HA  - f/u HELLP labs  - Stop Procardia   - Start Labetalol 200 BID for BPs  - Monitor BPs, GONGORA    Seen and D/w VIK G[place], PGY2

## 2023-04-26 NOTE — ED PROVIDER NOTE - PROGRESS NOTE DETAILS
obgyn consulted; most recent /87 [facility] [place]: OB/GYN advised 200mg of oral labetalol, 4mg of magnesium sulfate, CTH and admission.  patient agreed with decision for admission.  all questions answered.

## 2023-04-26 NOTE — H&P ADULT - NSHPPHYSICALEXAM_GEN_ALL_CORE
Physical Exam:   General: sitting comfortably in bed, NAD   Ext: non-tender b/l, no edema     Vital Signs Last 24 Hrs  T(C): 37.4 (26 Apr 2023 17:08), Max: 37.4 (26 Apr 2023 17:08)  T(F): 99.4 (26 Apr 2023 17:08), Max: 99.4 (26 Apr 2023 17:08)  HR: 113 (26 Apr 2023 17:30) (108 - 115)  BP: 148/92 (26 Apr 2023 17:30) (139/87 - 148/92)  RR: 18 (26 Apr 2023 17:30) (18 - 18)  SpO2: 100% (26 Apr 2023 17:30) (100% - 100%)

## 2023-04-26 NOTE — ED CLERICAL - NS ED CLERK NOTE PRE-ARRIVAL INFORMATION; ADDITIONAL PRE-ARRIVAL INFORMATION
CC/Reason For referral: patient post partum, delivered last week. Patient presented to office with hypertension and headache  Preferred Consultant(if applicable):  Who admits for you (if needed):  Do you have documents you would like to fax over?  Would you still like to speak to an ED attending? Please call after patient is seen.

## 2023-04-27 ENCOUNTER — TRANSCRIPTION ENCOUNTER (OUTPATIENT)
Age: 43
End: 2023-04-27

## 2023-04-27 VITALS
OXYGEN SATURATION: 99 % | DIASTOLIC BLOOD PRESSURE: 79 MMHG | TEMPERATURE: 98 F | HEART RATE: 96 BPM | SYSTOLIC BLOOD PRESSURE: 120 MMHG | RESPIRATION RATE: 18 BRPM

## 2023-04-27 LAB
MAGNESIUM SERPL-MCNC: 4.6 MG/DL — HIGH (ref 1.6–2.6)
MAGNESIUM SERPL-MCNC: 5.6 MG/DL — HIGH (ref 1.6–2.6)

## 2023-04-27 PROCEDURE — 85384 FIBRINOGEN ACTIVITY: CPT

## 2023-04-27 PROCEDURE — 82570 ASSAY OF URINE CREATININE: CPT

## 2023-04-27 PROCEDURE — 81001 URINALYSIS AUTO W/SCOPE: CPT

## 2023-04-27 PROCEDURE — 85025 COMPLETE CBC W/AUTO DIFF WBC: CPT

## 2023-04-27 PROCEDURE — 99285 EMERGENCY DEPT VISIT HI MDM: CPT

## 2023-04-27 PROCEDURE — 84156 ASSAY OF PROTEIN URINE: CPT

## 2023-04-27 PROCEDURE — 84550 ASSAY OF BLOOD/URIC ACID: CPT

## 2023-04-27 PROCEDURE — 83615 LACTATE (LD) (LDH) ENZYME: CPT

## 2023-04-27 PROCEDURE — 85610 PROTHROMBIN TIME: CPT

## 2023-04-27 PROCEDURE — 96375 TX/PRO/DX INJ NEW DRUG ADDON: CPT

## 2023-04-27 PROCEDURE — 70450 CT HEAD/BRAIN W/O DYE: CPT | Mod: MA

## 2023-04-27 PROCEDURE — 83880 ASSAY OF NATRIURETIC PEPTIDE: CPT

## 2023-04-27 PROCEDURE — 96374 THER/PROPH/DIAG INJ IV PUSH: CPT

## 2023-04-27 PROCEDURE — 80053 COMPREHEN METABOLIC PANEL: CPT

## 2023-04-27 PROCEDURE — 83735 ASSAY OF MAGNESIUM: CPT

## 2023-04-27 PROCEDURE — 36415 COLL VENOUS BLD VENIPUNCTURE: CPT

## 2023-04-27 PROCEDURE — 85730 THROMBOPLASTIN TIME PARTIAL: CPT

## 2023-04-27 RX ORDER — LEVOTHYROXINE SODIUM 125 MCG
100 TABLET ORAL DAILY
Refills: 0 | Status: DISCONTINUED | OUTPATIENT
Start: 2023-04-27 | End: 2023-04-27

## 2023-04-27 RX ORDER — LABETALOL HCL 100 MG
1 TABLET ORAL
Qty: 60 | Refills: 0
Start: 2023-04-27 | End: 2023-05-26

## 2023-04-27 RX ORDER — LEVOTHYROXINE SODIUM 125 MCG
1 TABLET ORAL
Qty: 0 | Refills: 0 | DISCHARGE
Start: 2023-04-27

## 2023-04-27 RX ADMIN — Medication 600 MILLIGRAM(S): at 17:21

## 2023-04-27 RX ADMIN — Medication 600 MILLIGRAM(S): at 01:37

## 2023-04-27 RX ADMIN — Medication 975 MILLIGRAM(S): at 09:30

## 2023-04-27 RX ADMIN — Medication 600 MILLIGRAM(S): at 00:37

## 2023-04-27 RX ADMIN — Medication 200 MILLIGRAM(S): at 06:07

## 2023-04-27 RX ADMIN — Medication 200 MILLIGRAM(S): at 17:22

## 2023-04-27 RX ADMIN — Medication 975 MILLIGRAM(S): at 05:29

## 2023-04-27 RX ADMIN — Medication 975 MILLIGRAM(S): at 16:15

## 2023-04-27 RX ADMIN — Medication 600 MILLIGRAM(S): at 07:37

## 2023-04-27 RX ADMIN — Medication 600 MILLIGRAM(S): at 12:05

## 2023-04-27 RX ADMIN — Medication 975 MILLIGRAM(S): at 09:13

## 2023-04-27 RX ADMIN — Medication 975 MILLIGRAM(S): at 15:59

## 2023-04-27 RX ADMIN — Medication 600 MILLIGRAM(S): at 06:08

## 2023-04-27 RX ADMIN — Medication 100 MICROGRAM(S): at 09:29

## 2023-04-27 RX ADMIN — Medication 975 MILLIGRAM(S): at 04:00

## 2023-04-27 RX ADMIN — Medication 600 MILLIGRAM(S): at 11:39

## 2023-04-27 RX ADMIN — Medication 600 MILLIGRAM(S): at 17:56

## 2023-04-27 NOTE — DISCHARGE NOTE ANTEPARTUM - CARE PROVIDER_API CALL
Trinidad Russo (DO)  [place] [address]  Phone: [phone]  Fax: [phone]  Follow Up Time: 1 week

## 2023-04-27 NOTE — DISCHARGE NOTE ANTEPARTUM - INSTRUCTIONS
any increase in temp 100.4 or above call MD take and record BP 2x per day if /60 ho;d medication x1 hr then retake BP if still below 110/60 call MD . If /100 call MD

## 2023-04-27 NOTE — DISCHARGE NOTE ANTEPARTUM - PATIENT PORTAL LINK FT
You can access the FollowMyHealth Patient Portal offered by [facility] by registering at the following website: [email or web address]. By joining [employer]’s FollowMyHealth portal, you will also be able to view your health information using other applications (apps) compatible with our system.
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
## 2023-04-27 NOTE — DISCHARGE NOTE NURSING/CASE MANAGEMENT/SOCIAL WORK - PATIENT PORTAL LINK FT
You can access the FollowMyHealth Patient Portal offered by [facility] by registering at the following website: [email or web address]. By joining [employer]’s FollowMyHealth portal, you will also be able to view your health information using other applications (apps) compatible with our system.

## 2023-04-27 NOTE — PROGRESS NOTE ADULT - ATTENDING COMMENTS
Pt seen at bedside. HA resolved completely since stopping procardia. BPs well controlled with labetalol. Will stop magnesium as has been >12 hrs. Suspect HA was due to procardia. Will monitor BPs off magnesium and if stable and controlled with labetalol will dc home today. Plan explained to pt who verb understanding and agreeable with plan.    Trinidad Russo, DO

## 2023-04-27 NOTE — PROGRESS NOTE ADULT - SUBJECTIVE AND OBJECTIVE BOX
OB Progress Note:  Delivery    S: Patient seen and examined at bedside. Pain well controlled, tolerating regular diet,  passing flatus, ambulating without difficulty, voiding spontaneously. Denies heavy vaginal bleeding, N/V, CP/SOB/lightheadedness/dizziness. Pt had no severe range BPs overnight. Pt denies HA, changes in vision, upper abdominal pain, or any other concerns.    O:   Vital Signs Last 24 Hrs  T(C): 36.6 (2023 04:00), Max: 37.4 (2023 17:08)  T(F): 97.8 (2023 04:00), Max: 99.4 (2023 17:08)  HR: 87 (2023 04:00) (80 - 115)  BP: 134/83 (2023 04:00) (107/55 - 148/92)  BP(mean): 76 (2023 21:15) (76 - 103)  RR: 18 (:00) (16 - 22)  SpO2: 99% (2023 04:00) (95% - 100%)    Parameters below as of 2023 04:00  Patient On (Oxygen Delivery Method): room air        Labs:  Blood type: A Positive  Rubella IgG: RPR: Negative                          12.4   7.85 >-----------< 368    (  @ 17:21 )             38.5    23 @ 17:21      143  |  106  |  14  ----------------------------<  88  4.4   |  24  |  0.76        Ca    9.7      2023 17:21  Mg     4.6<H>       Mg     1.8         TPro  6.9  /  Alb  3.8  /  TBili  0.2  /  DBili  x   /  AST  33  /  ALT  33  /  AlkPhos  88  23 @ 17:21          PE:  General: NAD  Abdomen: Mildly distended, appropriately tender, Fundus firm, incision c/d/i.  VE: No heavy vaginal bleeding  Extremities: No erythema, no pitting edema

## 2023-04-27 NOTE — DISCHARGE NOTE ANTEPARTUM - CARE PLAN
1 Principal Discharge DX:	Preeclampsia in postpartum period  Assessment and plan of treatment:	Please follow up with your OB within one week for a blood pressure check. Check blood pressures at home 3x a day. If your blood pressure is ever greater than 160/110, you develop a headache not relieved by tylenol, visual disturbances, or right upper abdominal pain, call your doctor or the hospital, or go to your nearest emergency room. Resume regular diet and normal activity. Please don't place anything in the vagina for 6 weeks--no sex, tampons, tub baths, or swimming pools.

## 2023-04-27 NOTE — DISCHARGE NOTE ANTEPARTUM - HOSPITAL COURSE
Patient admitted for postpartum preeclampsia  She received magnesium for 12 hours.   BP medication was changed to labetalol 200 BID from Procardia.  Patient admitted for postpartum preeclampsia  She received magnesium for 12 hours.   BP medication was changed to labetalol 200 BID from Procardia.   she was discharged on HD2 in stable condition.

## 2023-04-27 NOTE — DISCHARGE NOTE ANTEPARTUM - MEDICATION SUMMARY - MEDICATIONS TO STOP TAKING
I will STOP taking the medications listed below when I get home from the hospital:    NIFEdipine 30 mg oral tablet, extended release  -- 1 tab(s) by mouth every 24 hours

## 2023-04-27 NOTE — DISCHARGE NOTE ANTEPARTUM - PLAN OF CARE
Please follow up with your OB within one week for a blood pressure check. Check blood pressures at home 3x a day. If your blood pressure is ever greater than 160/110, you develop a headache not relieved by tylenol, visual disturbances, or right upper abdominal pain, call your doctor or the hospital, or go to your nearest emergency room. Resume regular diet and normal activity. Please don't place anything in the vagina for 6 weeks--no sex, tampons, tub baths, or swimming pools.

## 2023-04-27 NOTE — DISCHARGE NOTE ANTEPARTUM - MEDICATION SUMMARY - MEDICATIONS TO TAKE
I will START or STAY ON the medications listed below when I get home from the hospital:    ibuprofen 600 mg oral tablet  -- 1 tab(s) by mouth every 6 hours  -- Indication: For Pain    acetaminophen 500 mg oral tablet  -- 2 tab(s) by mouth every 6 hours  -- Indication: For Pain    oxyCODONE 5 mg oral tablet  -- 1 tab(s) by mouth every 4 hours  -- Indication: For Pain    labetalol 200 mg oral tablet  -- 1 tab(s) by mouth 2 times a day  -- Indication: For BP    levothyroxine 100 mcg (0.1 mg) oral tablet  -- 1 tab(s) by mouth once a day  -- Indication: For home

## 2023-04-27 NOTE — PROGRESS NOTE ADULT - ASSESSMENT
A/P: 41yo POD#6 s/p LTCS c/b sPEC in which she received Mag and started on Pro 30XL prior to discharge. Patient was then readmitted on PPD#5 for Mg due to persistent HA. Denies HA this morning.     #sPEC  -HELLP wnl  -c/w Magnesium for seizure prophylaxis  -Proc 30XL stopped. Labetalol 200 BID started yesterday for BP control    #Postpartum  - Continue regular diet  - SCDs, early ambulation for DVT ppx  - Continue motrin, tylenol, oxycodone PRN for pain control.      Keli PGY3

## 2023-04-29 LAB — SURGICAL PATHOLOGY STUDY: SIGNIFICANT CHANGE UP

## 2023-05-01 ENCOUNTER — TRANSCRIPTION ENCOUNTER (OUTPATIENT)
Age: 43
End: 2023-05-01

## 2023-05-22 LAB
T4 FREE SERPL-MCNC: 1.3 NG/DL
TSH SERPL-ACNC: 0.69 UIU/ML

## 2023-05-23 ENCOUNTER — APPOINTMENT (OUTPATIENT)
Dept: ENDOCRINOLOGY | Facility: CLINIC | Age: 43
End: 2023-05-23
Payer: COMMERCIAL

## 2023-05-23 DIAGNOSIS — E03.9 HYPOTHYROIDISM, UNSPECIFIED: ICD-10-CM

## 2023-05-23 DIAGNOSIS — E06.3 AUTOIMMUNE THYROIDITIS: ICD-10-CM

## 2023-05-23 PROCEDURE — 99213 OFFICE O/P EST LOW 20 MIN: CPT | Mod: 95

## 2023-05-23 NOTE — HISTORY OF PRESENT ILLNESS
[Home] : at home, [unfilled] , at the time of the visit. [Medical Office: ([facility])___] : at the medical office located in  [Verbal consent obtained from patient] : the patient, [unfilled] [FreeTextEntry1] : 42 year female  f/u for hypothyroidism management. \par \par *** Televisit  May 23, 2023 ***\par \par s/p delivery on 4/21/23 (baby son)\par breastfeeding . losing weight. current wt- 240 lbs\par remains on  L-thyroxine 100 mcg\par TSH- 0.69\par finishing fellowship next month. plans to move to [place], [place]\par \par *** Nov 28, 2022 ***\par \par feels well.  IUP ~ 18 wks (baby boy)\par fetal sonos are fine\par genetic tests are fine, not going through amnio\par on L-thyroxine 88 mcg, stopped Metformin\par passed OGTT\par \par \par *** Aug 29, 2022 ***\par \par IUP ~ 5 weeks. 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 struggles with a weight loss. trying IF w/o any help. working out 6/week\par taking L-thyroxine 75 mcg, metformin er 750 mg bid\par \par \par *** Sep 08, 2021 ***\par \par on Synthroid 88 mcg\par feels fine on the new dose, no c/o\par \par \par *** Jul 15, 2021 ***\par \par feels well, no new c/o\par on Synthroid brand 100 mcg\par seeing fertility specialist\par \par HPI:\par Ms. FRENCH  was diagnosed with hypothyroidism in 09/20 in settings for fertility evaluation. She was started on L-thyroxine at that time. She got pregnant in 11/20, s/p TOP for trisomy 21. She also reports one prior miscarriage.  She's presently on a generic L-thyroxine 100 mcg. She's been also diagnosed with PCOS, and has been on metformin for the past 4 years. She's under care of the fertility specialist. 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 Her mother is being treated for Hashimoto's thyroiditis. \par Labs from 01/21- TSH- 1.78\par Thyroid US (10/13/20)- heterogenous thyroid. no nodules

## 2023-05-23 NOTE — ASSESSMENT
[FreeTextEntry1] : Hypothyroidism\par s/p recent delivery\par - decrease Synthroid 88 mcg\par - can cont PNV with 150 mcg iodine\par TFT's in 6 weeks
98

## 2023-06-05 NOTE — OB PROVIDER H&P - NS_VBACATTEMPT_OBGYN_ALL_OB
Received paperwork from patient.     ARTURO called [employer] [phone] to assist with [place] paperwork. They require associate #. Got associate #: [id number] from patient. Called [employer] again and phone line hung up on CM after 39 minutes on hold. Will call again.    N/A

## 2023-06-28 ENCOUNTER — APPOINTMENT (OUTPATIENT)
Dept: ORTHOPEDIC SURGERY | Facility: CLINIC | Age: 43
End: 2023-06-28
Payer: COMMERCIAL

## 2023-06-28 VITALS
WEIGHT: 240 LBS | HEART RATE: 94 BPM | BODY MASS INDEX: 39.99 KG/M2 | OXYGEN SATURATION: 95 % | DIASTOLIC BLOOD PRESSURE: 85 MMHG | SYSTOLIC BLOOD PRESSURE: 123 MMHG | HEIGHT: 65 IN

## 2023-06-28 DIAGNOSIS — M65.4 RADIAL STYLOID TENOSYNOVITIS [DE QUERVAIN]: ICD-10-CM

## 2023-06-28 PROCEDURE — 73110 X-RAY EXAM OF WRIST: CPT | Mod: RT

## 2023-06-28 PROCEDURE — 20550 NJX 1 TENDON SHEATH/LIGAMENT: CPT | Mod: RT

## 2023-06-28 PROCEDURE — 99204 OFFICE O/P NEW MOD 45 MIN: CPT | Mod: 25

## 2023-06-29 ENCOUNTER — TRANSCRIPTION ENCOUNTER (OUTPATIENT)
Age: 43
End: 2023-06-29

## 2023-06-29 LAB
T3 SERPL-MCNC: 95 NG/DL
T4 FREE SERPL-MCNC: 1.1 NG/DL
TSH SERPL-ACNC: 1.23 UIU/ML

## 2023-07-07 ENCOUNTER — TRANSCRIPTION ENCOUNTER (OUTPATIENT)
Age: 43
End: 2023-07-07

## 2023-07-07 RX ORDER — LEVOTHYROXINE SODIUM 0.09 MG/1
88 TABLET ORAL
Qty: 90 | Refills: 2 | Status: ACTIVE | COMMUNITY
Start: 2020-09-09 | End: 1900-01-01

## 2024-08-30 NOTE — OB RN DELIVERY SUMMARY - NS_TRANSFUSREACT_OBGYN_ALL_OB
SUPPORTIVE AND PALLIATIVE ONCOLOGY OUTPATIENT FOLLOW-UP    Virtual or Telephone Consent    A telephone visit (audio only) between the patient (at the originating site) and the provider (at the distant site) was utilized to provide this telehealth service.   Verbal consent was requested and obtained from Sharmila Huerta on this date, 9/4/2024 for a telehealth visit.     SERVICE DATE: 9/4/2024    Subjective   HISTORY OF PRESENT ILLNESS: Sharmila Huerta is a 67 y.o. 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 She follows with Dr. Parker as her primary oncologist, currently pursuing maintenance therapy with Olaparib.  She has been referred to Supportive Oncology/Palliative Care for management of neuropathy, neoplasm related pain, loss of appetite, and fatigue.     Pain Assessment:  Pain Score:  0  Location:    Description:      Symptom Assessment:  Pain:a little - general arthralgias but controlled with acetaminophen as needed. Also got a massage yesterday and feels that helped.  Numbness or Tingling in hands/feet/other: a little - continues with neuropathy mainly in b/l feet but notes it to be well controlled with Pregabalin, Topiramate, and Duloxetine. Has had 1 treatment with acupuncture and scheduled out for a few more sessions. Has not noticed any changes as of yet  Sore Muscles/Spasms: none  Headache: none  Dizziness:none  Constipation: none  Diarrhea: a little - endorses loose stool from time to time but no more than 1-2x within a day. Bowels are regular, just generally soft to loose in consistency. This is tolerable  Nausea: a little - reports before bed having a regurgitation that awakes her in the night and therefore has been taking Prochlorperazine or Ondansetron prior to bed. She is not on any antacid medications at this time. Does endorse this feeling like reflux more than nausea/vomiting  Vomiting: none  Lack of Appetite: a little - up and down but generally  stable  Weight Loss: none  Taste changes: none  Dry Mouth: none  Pain in Mouth/Swallowing: none  Lack of Energy: a little - general fatigue but manageable  Difficulty Sleeping: none  Worrying: none  Anxiety: none  Depression: none  Shortness of breath: none  Other: none      Information obtained from: chart review and interview of patient  ______________________________________________________________________        Objective     Lab on 07/22/2024   Component Date Value Ref Range Status    Cancer  07/22/2024 5.9  0.0 - 30.2 U/mL Final    WBC 07/22/2024 5.7  4.4 - 11.3 x10*3/uL Final    nRBC 07/22/2024 0.0  0.0 - 0.0 /100 WBCs Final    RBC 07/22/2024 3.95 (L)  4.00 - 5.20 x10*6/uL Final    Hemoglobin 07/22/2024 10.5 (L)  12.0 - 16.0 g/dL Final    Hematocrit 07/22/2024 33.3 (L)  36.0 - 46.0 % Final    MCV 07/22/2024 84  80 - 100 fL Final    MCH 07/22/2024 26.6  26.0 - 34.0 pg Final    MCHC 07/22/2024 31.5 (L)  32.0 - 36.0 g/dL Final    RDW 07/22/2024 18.3 (H)  11.5 - 14.5 % Final    Platelets 07/22/2024 261  150 - 450 x10*3/uL Final    Neutrophils % 07/22/2024 59.4  40.0 - 80.0 % Final    Immature Granulocytes %, Automated 07/22/2024 0.4  0.0 - 0.9 % Final    Lymphocytes % 07/22/2024 32.2  13.0 - 44.0 % Final    Monocytes % 07/22/2024 6.2  2.0 - 10.0 % Final    Eosinophils % 07/22/2024 1.4  0.0 - 6.0 % Final    Basophils % 07/22/2024 0.4  0.0 - 2.0 % Final    Neutrophils Absolute 07/22/2024 3.37  1.20 - 7.70 x10*3/uL Final    Immature Granulocytes Absolute, Au* 07/22/2024 0.02  0.00 - 0.70 x10*3/uL Final    Lymphocytes Absolute 07/22/2024 1.82  1.20 - 4.80 x10*3/uL Final    Monocytes Absolute 07/22/2024 0.35  0.10 - 1.00 x10*3/uL Final    Eosinophils Absolute 07/22/2024 0.08  0.00 - 0.70 x10*3/uL Final    Basophils Absolute 07/22/2024 0.02  0.00 - 0.10 x10*3/uL Final    Glucose 07/22/2024 151 (H)  74 - 99 mg/dL Final    Sodium 07/22/2024 142  136 - 145 mmol/L Final    Potassium 07/22/2024 4.2  3.5 - 5.3 mmol/L  "Final    Chloride 07/22/2024 110 (H)  98 - 107 mmol/L Final    Bicarbonate 07/22/2024 23  21 - 32 mmol/L Final    Anion Gap 07/22/2024 13  10 - 20 mmol/L Final    Urea Nitrogen 07/22/2024 13  6 - 23 mg/dL Final    Creatinine 07/22/2024 0.87  0.50 - 1.05 mg/dL Final    eGFR 07/22/2024 74  >60 mL/min/1.73m*2 Final    Calcium 07/22/2024 9.3  8.6 - 10.6 mg/dL Final    Albumin 07/22/2024 3.9  3.4 - 5.0 g/dL Final    Alkaline Phosphatase 07/22/2024 70  33 - 136 U/L Final    Total Protein 07/22/2024 7.0  6.4 - 8.2 g/dL Final    AST 07/22/2024 9  9 - 39 U/L Final    Bilirubin, Total 07/22/2024 0.4  0.0 - 1.2 mg/dL Final    ALT 07/22/2024 8  7 - 45 U/L Final    Cholesterol 07/22/2024 169  0 - 199 mg/dL Final    HDL-Cholesterol 07/22/2024 34.4  mg/dL Final    Cholesterol/HDL Ratio 07/22/2024 4.9   Final    LDL Calculated 07/22/2024 102 (H)  <=99 mg/dL Final    VLDL 07/22/2024 33  0 - 40 mg/dL Final    Triglycerides 07/22/2024 164 (H)  0 - 149 mg/dL Final    Non HDL Cholesterol 07/22/2024 135  0 - 149 mg/dL Final    Thyroid Stimulating Hormone 07/22/2024 0.74  0.44 - 3.98 mIU/L Final    Hemoglobin A1C 07/22/2024 6.6 (H)  see below % Final    Estimated Average Glucose 07/22/2024 143  Not Established mg/dL Final      PHYSICAL EXAMINATION   Vital Signs:   Vital signs reviewed      11/2/2023     2:12 PM 11/8/2023     3:39 PM 12/27/2023    12:31 PM 6/5/2024     1:12 PM 7/3/2024     2:52 PM 7/25/2024     3:40 PM 7/30/2024    12:08 PM   Vitals   Systolic 106 127 117 115 118 118    Diastolic 64 78 68 58 60 82    Heart Rate 85 88  89  100    Temp 36.6 °C (97.9 °F) 36.3 °C (97.3 °F)  36 °C (96.8 °F)  36.2 °C (97.2 °F)    Resp 16 16  16 16 18    Height (in)       1.661 m (5' 5.39\")   Weight (lb) 259.26 255.95 253 243.8  249.12 249.12   BMI 42.63 kg/m2 42.08 kg/m2 41.6 kg/m2 40.08 kg/m2  40.96 kg/m2 40.96 kg/m2   BSA (m2) 2.33 m2 2.31 m2 2.3 m2 2.26 m2  2.28 m2 2.28 m2   Visit Report Report Report Report Report Report Report      Physical " Exam  Not complete dt telephone visit    ASSESSMENT/PLAN    Arthralgia Pain/CIPN  - Continue Pregabalin 200mg TID - encouraged to take as prescribed  - Continue Duloxetine 90mg once daily at bed  - Continue Topiramate 100mg BID  - Continue Acetaminophen 500mg-1000 [id number] PRN for pain - rare use  - Referral to [facility] - Dr Tan (established 6/26/2024) - started acupuncture therapy     Constipation/Diarrhea  - Continue Docusate 100mg BID PRN for constipation  - Continue Miralax 17grams 1-2x per day PRN for constipation  - Discussed using Loperamide 2mg PRN for diarrhea - MAX 8 caps/24 hours  - Continue Probiotic Drink     Nausea/GERD  - Start Famotidine 20mg at bed  - Continue Ondansetron 8mg [id number] PRN  - Continue Prochlorperazine 10mg [id number]     SOB/Cough  - Continue Albuterol 90mcg/inh; 2 puffs [id number] PRN for SOB  - Continue Benzonatate 200mg TID PRN for cough     Depression/Insomnia  - Referrals sent to Music Therapy and Spiritual Therapy  - Email sent to  for assistance with LW/POA and other home needs  - Referral sent to Smoking Cessation Program   - Registered at [facility] - encouraged to attend for counseling services and assistance with grief coping in loss of her brother  - Continue Duloxetine 90mg at bed - discussed increase but patient declines  - Trazodone - no longer needed     Fatigue  - Continue to monitor  - Encouraged daily physical activity as tolerated    Next Follow-Up Visit:  Return to clinic in 3 months in person    Signature and billing  Medical complexity was moderate level due to due to complexity of problems, extensive data review, and high risk of management/treatment.  Time was spent on the following: Prep Time, Time Directly with Patient/Family/Caregiver, Documentation Time. Total time spent: 35      Data  Diagnostic tests and information reviewed for today's visit:  Most recent labs and imaging results, Medications     Some elements copied from Palliative  Care note on 6/5/2024, the elements have been updated and all reflect current decision making from today, 9/4/2024.      Plan of Care discussed with: Patient    SIGNATURE: SENAIT Syed    Contact information:  Supportive and Palliative Oncology  Monday-Friday 8 AM-5 PM  Phone:  [phone], press option #5, then option #1.   Or Epic Secure Chat        No

## 2024-11-27 NOTE — DISCHARGE NOTE NURSING/CASE MANAGEMENT/SOCIAL WORK - NSDCPETBCESMAN_GEN_ALL_CORE
-Continue Seroquel 100 mg TID for anxiety, insomnia   -Klonopin 1 mg 3 times daily for severe anxiety and panic- on this medication for over 20 years, using appropriately, provider recommends decreasing usage and if unable to consider entering partial program again to increase coping skills, pt hesistant  Discussed with patient the risks of sedation, respiratory depression, impairment of ability to drive and potential for abuse and addiction related to treatment with benzodiazepine medications. The patient understands risk of treatment with benzodiazepine medications, agrees to not drive if feels impaired and agrees to take medications as prescribed. Patient was also informed of risks of being on or starting opioid medications due to drug interactions and potential for serious respiratory depression and death   If you are a smoker, it is important for your health to stop smoking. Please be aware that second hand smoke is also harmful.

## 2025-07-18 NOTE — H&P PST ADULT - NSANTHBPHIGHRD_ENT_A_CORE
[facility] REHABILITATION PROGRAM  POST ADMISSION PHYSICIAN EVALUATION (SAEID) - HISTORY AND PHYSICAL          Referring Physician: No ref. provider found  Primary Care Physician: Pcp Outside [facility], Unknown    Chief Complaint: Patient requires admission to acute inpatient rehabilitation.    History of Present Illness:   82-year-old female with history of atrial fibrillation, COPD, CKD, HF, PE, HTN who presented to [facility] 7/12/25 with AMS and dysarthria. CTA head and neck demonstrated moderate to severe narrowing of distal left M2. Neurology and neuro IR evaluated. Etiology thought to be small vessel disease/hypertension. TTE with EF 67%, negative bubble, irregular mass on mitral valve, cannot exclude thrombus or tumor. ADRIANA Without evidence of thrombus. Neurology recommended resumption of home Eliquis and 30 day event monitor (placed 7/17).     LDL 73  A1c 6.2    Patient states frustration with rule of not getting up without assistance. Patient reports she was not on Eliquis at home due to cost.       Past Medical History  Past Medical History:   Diagnosis Date    Arthritis     osteoarthritis, bilateral knees    Atrial fibrillation  (CMD)     CHF (congestive heart failure)  (CMD)     Chronic kidney disease     Congestive cardiac failure  (CMD)     COPD (chronic obstructive pulmonary disease)  (CMD)     Diabetes mellitus  (CMD)     Diverticulosis     Essential (primary) hypertension     No known problems     Obesity (BMI 35.0-39.9 without comorbidity)     Pulmonary edema (CMD)     Pulmonary hypertension  (CMD)          Surgical History  Past Surgical History:   Procedure Laterality Date    Ectopic pregnancy surgery      Foot surgery      No past surgeries         Family History:  Family History   Problem Relation Age of Onset    Heart disease Mother     Hypertension Mother     Stroke Mother     Dementia/Alzheimers Mother     Cancer, Breast Neg Hx     Cancer Neg Hx     Cancer, Colon Neg Hx      Cancer, Ovarian Neg Hx          Social History  Patient  reports that she has been smoking cigarettes. She has a 34 pack-year smoking history. She has never used smokeless tobacco. She reports that she does not drink alcohol and does not use drugs.    Prior Living Situation:  Prior Living Situation  Information Provided By:: Patient  Type of Home: House  Home Layout: Two level  # Steps to Enter: 7  Rails to Enter: 1 on left going up  # Steps in the Home: 10  Rails in Home: 1 on left going up  Lives With: Son  Receives Help From: Family  Transportation: Other (comment) ([employer] transportation)  Bathroom Shower/Tub: Tub/Shower unit  Bathroom Toilet: Standard  Bathroom Equipment: Shower chair  Bathroom Accessibility: Not entry level  Laundry on Main Level: No  Home Equipment: Cane, Four-wheeled walker      Allergies  ALLERGIES:  Poppy seeds   (food), Clopidogrel, and Ibuprofen    Medications  Current Facility-Administered Medications   Medication Dose Route Frequency Provider Last Rate Last Admin    docusate sodium (ENEMEEZ MINI) 283 MG rectal enema 283 mg  283 mg Rectal Daily PRN Go Kebede MD        docusate sodium-sennosides (SENOKOT S) 50-8.6 MG 2 tablet  2 tablet Oral BID PRN Go Kebede MD        bisacodyl (DULCOLAX) suppository 10 mg  10 mg Rectal Daily PRN Go Kebede MD        acetaminophen (TYLENOL) tablet 650 mg  650 mg Oral Q4H PRN Go Kebede MD        Or    acetaminophen (TYLENOL) suppository 650 mg  650 mg Rectal Q4H PRN Go Kebede MD        atorvastatin (LIPITOR) tablet 80 mg  80 mg Oral Nightly Jessika Devine MD   80 mg at 07/17/25 2047    furosemide (LASIX) tablet 20 mg  20 mg Oral Daily Jessika Devine MD   20 mg at 07/18/25 0831    gabapentin (NEURONTIN) capsule 300 mg  300 mg Oral Daily PC Jessika Devine MD   300 mg at 07/18/25 0832    guaiFENesin (MUCINEX) ER tablet 1,200 mg  1,200 mg Oral 2 times per day Jessika Devine MD   1,200 mg at  07/18/25 0832    losartan (COZAAR) tablet 100 mg  100 mg Oral Daily Jessika Devine MD   100 mg at 07/18/25 0832    magnesium hydroxide (MILK OF MAGNESIA) 400 MG/5ML suspension 30 mL  30 mL Oral Daily PRN Jessika Devine MD        Magnesium Standard Replacement Protocol   Does not apply See Admin Instructions Jessika Devine MD        melatonin tablet 3 mg  3 mg Oral Nightly PRN Jessika Devine MD   3 mg at 07/17/25 2048    metoPROLOL succinate (TOPROL-XL) ER tablet 50 mg  50 mg Oral Daily Jessika Devine MD   50 mg at 07/18/25 0832    nicotine (NICODERM) 21 MG/24HR patch 1 patch  1 patch Transdermal Daily Jessika Devine MD   1 patch at 07/18/25 0832    NIFEdipine XL (PROCARDIA XL) ER tablet 60 mg  60 mg Oral Daily Jessika Devine MD   60 mg at 07/18/25 0832    polyethylene glycol (MIRALAX) packet 17 g  17 g Oral Daily PRN Jessika Devine MD        traMADol (ULTRAM) tablet 25 mg  25 mg Oral Q6H PRN Jessika Devine MD        apixaBAN (ELIQUIS) tablet 5 mg  5 mg Oral 2 times per day Jesiska Devine MD   5 mg at 07/18/25 0831    ondansetron (ZOFRAN ODT) disintegrating tablet 4 mg  4 mg Oral Q12H PRN Honey Navas MD            Review of Systems:   Review of Systems   Constitutional:  Positive for activity change. Negative for appetite change, chills and fever.   HENT:  Positive for drooling and trouble swallowing.    Eyes:  Negative for visual disturbance.   Respiratory:  Negative for cough and shortness of breath.    Cardiovascular:  Negative for chest pain.   Gastrointestinal:  Negative for abdominal pain, constipation, diarrhea, nausea and vomiting.   Genitourinary:  Negative for dysuria.   Musculoskeletal:  Positive for gait problem.   Skin:  Negative for rash.   Neurological:  Positive for facial asymmetry and weakness. Negative for dizziness and headaches.   Psychiatric/Behavioral:  Negative for sleep disturbance.         Last Recorded Vitals    Vital Last Value 24 Hour Range   Temperature 98.2 °F (36.8 °C)  (07/17/25 1803) Temp  Min: 98.2 °F (36.8 °C)  Max: 98.2 °F (36.8 °C)   Pulse 77 (07/18/25 0830) Pulse  Min: 63  Max: 77   Respiratory 16 (07/18/25 0830) Resp  Min: 16  Max: 18   Non-Invasive  Blood Pressure (!) 156/73 (07/18/25 0830) BP  Min: 121/72  Max: 156/73   Pulse Oximetry 97 % (07/18/25 0830) SpO2  Min: 92 %  Max: 97 %     Vital Today Admitted   Weight 70 kg (154 lb 5.2 oz) (07/17/25 1507) Weight: 70 kg (154 lb 5.2 oz) (07/17/25 1507)       INTAKE/OUTPUT:      Intake/Output Summary (Last 24 hours) at 7/18/2025 1550  Last data filed at 7/18/2025 1200  Gross per 24 hour   Intake 490 ml   Output 250 ml   Net 240 ml       Bowel:       PVR: Bladder Scan  Reason for Scan: per order (07/18/25 1300)  Bladder Scanned Volume (mL): 22 mL (07/18/25 1300)    Wound Documentation:  Wound Treatment and Goals (most recent)       Wound Treatment and Goals    No documentation.                   Insurance is:  Payor: [facility] MEDICARE ADVANTAGE / Plan: [facility] MEDICARE ADVANTAGE O [facility] / Product Type: MEDICARE      PREMORBID FUNCTIONAL STATUS: Independent    CURRENT FUNCTIONAL STATUS:     Mobility (since 7/16/2025)       rolling left  modified independent    rolling right  modified independent    supine to sit  modified independent    sit to supine  modified independent    sit to stand  contact guard/touching/steadying assist    stand to sit  contact guard/touching/steadying assist    stand pivot  contact guard/touching/steadying assist    car  minimal assist    transfers assistive devices  gait belt; 2-wheeled walker; 4-wheeled walker    toilet transfer  gait belt; 2-wheeled walker    shower transfer assist  not attempted due to environmental limitations    tub transfer assist  not attempted due to environmental limitations    gait assistance  contact guard/touching/steadying assist    1st trial  200    gait surface  even    gait assistance trial 2  contact guard/touching/steadying assist; stand by assist    gait AD (trial  2)  gait belt; 4-wheeled walker    1st trial (trial 2)  100  x2    2nd trial (trial 2)  40  even/uneven    gait type (trial 2)  narrow base of support; forward flexed at hips; decreased garland/pace    gait surface (trial 2)  even; uneven    number of stairs, trial 1  12    stair assistance  minimal assist    stair rails  left    stair pattern  step-to; reciprocal    stair AD  gait belt            Self Care/ADL (since 7/16/2025)       eating assist  set up    eating position  wheelchair    eating deficit  set up    grooming assist  stand by assist    oral hygiene assist  contact guard/touching/steadying assist    oral hygiene position  standing at sink    grooming/oral hygiene defict  standing with assistive device    upper body dressing  edge of bed    upper body dressing deficit  supervision/safety; fasteners    lower body dressing assist  contact guard/touching/steadying assist    lower body dressing position  edge of bed; standing    footwear assist  stand by assist    footware position  edge of bed    lower body dressing deficit  supervision/safety; verbal cueing; steadying    toileting assist  stand by assist    toileting deficit  supervision/safety    toileting equipment used  grab bar use    bathing assist  contact guard/touching/steadying assist    bathing position  standing at sink    bathing deficit  steadying            Communication/Cognition (since 7/16/2025)       Affect / Behavior  calm; cooperative    form of communication  verbal; expressive deficits; slurred    attention span  appears intact    following commands  follows multistep commands with repetition    Safety Judgement  decreased awareness of need for assistance; decreased awareness of need for safety    Awareness of Deficits  decreased awareness of deficits                DIET:   Dietary Orders (From admission, onward)       Start     Ordered    07/17/25 1455  Cardiac; Yes, Medical Nutrition Management by RD (Registered Dietitian) Diet   DIET EFFECTIVE NOW        References:    [facility] [employer] [facility] [employer] [facility] Medical Nutrition Therapy   Question Answer Comment   Diet Modifiers Cardiac    Medical Nutrition Management by GUERLINE (Registered Dietitian) Yes, Medical Nutrition Management by GUERLINE (Registered Dietitian)        07/17/25 6125                        PHYSICAL EXAMINATION   Physical Exam  Vitals and nursing note reviewed.   Constitutional:       General: She is not in acute distress.     Appearance: Normal appearance. She is not ill-appearing.   HENT:      Head: Normocephalic and atraumatic.      Mouth/Throat:      Mouth: Mucous membranes are moist.   Eyes:      Extraocular Movements: Extraocular movements intact.   Cardiovascular:      Rate and Rhythm: Normal rate.   Pulmonary:      Effort: Pulmonary effort is normal. No respiratory distress.   Abdominal:      General: Abdomen is flat. There is no distension.      Palpations: Abdomen is soft.   Musculoskeletal:      Right lower leg: Edema present.      Left lower leg: Edema present.   Skin:     General: Skin is warm and dry.      Findings: No rash.   Neurological:      Mental Status: She is alert and oriented to person, place, and time.      Cranial Nerves: Cranial nerve deficit present.      Sensory: No sensory deficit.      Motor: Weakness present.      Gait: Gait abnormal.      Comments: Dysarthria  Right hemiparesis   Right DF 4-,  KE 4, HF 4, Grasp 4, EE 3, EF 4 Sabd >3     Psychiatric:         Mood and Affect: Mood normal.         Labs  Recent Labs   Lab 07/18/25  0634 07/17/25  0606 07/16/25  0534   WBC 9.5 10.6 9.2   RBC 4.10 4.54 3.96*   HGB 11.8* 13.0 11.4*   HCT 36.4 40.2 36.1   MCV 88.8 88.5 91.2   MCH 28.8 28.6 28.8   MCHC 32.4 32.3 31.6*   RDWCV 15.3* 15.1* 15.1*    233 228   TLYMPH 39 30 33       Recent Labs   Lab 07/18/25  0634 07/17/25  0606 07/16/25  0534   SODIUM 144 145 145   CHLORIDE 113* 109 108   BUN 15 20 18   POTASSIUM 3.6 3.7 3.7   GLUCOSE 82 121* 105*    CREATININE 1.39* 1.45* 1.57*   CALCIUM 9.1 9.5 9.9       Recent Labs   Lab 07/14/25  1115 07/12/25  0254   GLUB 127* 138*       Recent Labs   Lab 07/12/25  0303   INR 1.0       Imaging  No orders to display         ASSESSMENT:    The etiologic diagnosis and co-morbidities listed below require acute inpatient rehabilitation, with the 24-hour availability and frequent intervention of a rehabilitation physician.     Right hemiparesis   Left frontal lobe infarct   Dysarthria   Impaired mobility   Atrial fibrillation - on AC   Hypertension   CKD   Fall risk   CHF   COPD   Tobacco use     Impairment group code: 1.2       Etiologic diagnosis:    PLAN:    The patient will be admitted to the [address] [place] [address] rehabilitation unit to begin comprehensive acute inpatient rehabilitation.      The patient requires a physician specializing in Rehabilitation to provide daily oversight and close medical supervision including: Rehabilitation leadership, coordination of treatment team, medical and co-morbidity management, pain management, and bowel and bladder management.    The patient requires 24-hour nursing for pain issues, bowel and bladder care, skin integrity wound care, trach management, medication management, safety measures, patient/family education, carry-over of therapeutic strategies, family/caregiver training, and preventing risk factors/complications.      The patient requires an intense level of services by specially-trained therapists with an expected frequency and duration of treatment of at least 3 hours/day, 5-7 days/week, with a coordinated, interdisciplinary team approach, consisting of:   - Occupational therapy to improve feeding, ADLs/self care skills, toileting, toilet transfers, splinting, adaptive equipment, household tasks, higher functioning activities, strengthening, stretching, endurance, range of motion, DME assessment, and patient/caregiver training  - Physical therapy to improve bed mobility, transfer skills,  wheelchair skills, balance training, gait training, neuromuscular re-education, range of motion/joint mobilization, strengthening, stretching, endurance, stair training, DME assessment, and patient/caregiver training  - Speech therapy to improve cognition, communication skills, swallowing, and compensatory strategies  - Recreational therapy to integrate skills obtained in other therapies and for community reintegration    The patient will also require:  -  for discharge planning and family interventions   - Weekly team conferences to coordinate plan of care   - Dietitian and Nutrition services for adequate nutrition and education  - Neuropsychology for adjustment and supportive counseling, and for cognitive assessment    Potential goals by discharge / expected level of improvement: Self care and mobility at  supervision - mod I   Patient’s prognosis for significant practical improvement: fair   Expected length of time needed to achieve that level of improvement: 5-7 days   Anticipated discharge disposition: home   Anticipated post-discharge treatments: outpatient     Safe to initiate therapy program: Yes    Risk for clinical complications: The patient is at increased risk for respiratory failure, aspiration pneumonia, falls, fatigue, infection, alteration in skin integrity, pain, DVT, PE, dehydration, electrolyte imbalance, poor nutrition, altered sleep pattern, alteration in bowel/bladder control, and cardiopulmonary events. The patient's cardiopulmonary response to exercise and activity will be monitored by nursing and therapy staff with regular checks of blood pressure, pulse rate, and oxygen saturations.  Given the above listed co-morbidities and risks for complications, the patient would benefit from 24-hour availability of the rehabilitation team for closer medical monitoring and coordination of care with other specialists.  To minimize these risks for complications, the patient will receive  close medical monitoring provided by the physiatrist who will also coordinate care with multiple disciplines.       Right hemiparesis   Left frontal lobe infarct   - PT/OT to increase mobility, coordination, balance, endurance, self care    - secondary prevention with statin/ Eliquis   - Event monitor placed 7/17    Dysarthria   - ST for evaluation and treatment     Atrial fibrillation   - continued on Eliquis, will price check   - Toprol      Hypertension   - continued on Procardia, Toprol, Losartan, Lasix   - appreciate Hospitalist for management       ID: monitor for signs/symptoms of infection     CARD: Monitor cardiac status with increased activity     NEURO:     PULM: Monitor respiratory status with increased activity     HEME: intermittent monitoring      GI:  Will place on bowel program.    :  Will place on bladder program.    SKIN: Nursing to monitor for any issues with skin integrity.    VASC: Continue Eliquis    ELECTROLYTES:  Will monitor and make adjustments as needed.     NUTRITION:    DISPOSITION: Tentative discharge date TBD   Plan of care completed             No